# Patient Record
Sex: FEMALE | Race: WHITE | NOT HISPANIC OR LATINO | Employment: FULL TIME | URBAN - METROPOLITAN AREA
[De-identification: names, ages, dates, MRNs, and addresses within clinical notes are randomized per-mention and may not be internally consistent; named-entity substitution may affect disease eponyms.]

---

## 2017-01-25 ENCOUNTER — OFFICE VISIT (OUTPATIENT)
Dept: INTERNAL MEDICINE | Facility: CLINIC | Age: 32
End: 2017-01-25

## 2017-01-25 VITALS
HEIGHT: 67 IN | OXYGEN SATURATION: 98 % | HEART RATE: 70 BPM | WEIGHT: 176 LBS | SYSTOLIC BLOOD PRESSURE: 122 MMHG | DIASTOLIC BLOOD PRESSURE: 80 MMHG | BODY MASS INDEX: 27.62 KG/M2

## 2017-01-25 DIAGNOSIS — R00.2 HEART PALPITATIONS: ICD-10-CM

## 2017-01-25 DIAGNOSIS — F41.8 MIXED ANXIETY AND DEPRESSIVE DISORDER: Primary | ICD-10-CM

## 2017-01-25 DIAGNOSIS — F41.0 PANIC ATTACK: ICD-10-CM

## 2017-01-25 PROBLEM — F41.1 GAD (GENERALIZED ANXIETY DISORDER): Status: RESOLVED | Noted: 2017-01-25 | Resolved: 2017-01-25

## 2017-01-25 PROBLEM — F41.1 GAD (GENERALIZED ANXIETY DISORDER): Status: ACTIVE | Noted: 2017-01-25

## 2017-01-25 LAB
ALBUMIN SERPL-MCNC: 5 G/DL (ref 3.5–5.2)
ALBUMIN/GLOB SERPL: 2.1 G/DL
ALP SERPL-CCNC: 48 U/L (ref 40–129)
ALT SERPL-CCNC: 12 U/L (ref 5–33)
AST SERPL-CCNC: 15 U/L (ref 5–32)
BASOPHILS # BLD AUTO: 0.06 10*3/MM3 (ref 0–0.2)
BASOPHILS NFR BLD AUTO: 0.8 % (ref 0–2)
BILIRUB SERPL-MCNC: 0.7 MG/DL (ref 0.2–1.2)
BUN SERPL-MCNC: 12 MG/DL (ref 6–20)
BUN/CREAT SERPL: 16.2 (ref 7–25)
CALCIUM SERPL-MCNC: 9.7 MG/DL (ref 8.6–10.5)
CHLORIDE SERPL-SCNC: 98 MMOL/L (ref 98–107)
CHOLEST SERPL-MCNC: 162 MG/DL (ref 0–200)
CHOLEST/HDLC SERPL: 2.84 {RATIO}
CO2 SERPL-SCNC: 25 MMOL/L (ref 22–29)
CREAT SERPL-MCNC: 0.74 MG/DL (ref 0.57–1)
EOSINOPHIL # BLD AUTO: 0.09 10*3/MM3 (ref 0.1–0.3)
EOSINOPHIL NFR BLD AUTO: 1.2 % (ref 0–4)
ERYTHROCYTE [DISTWIDTH] IN BLOOD BY AUTOMATED COUNT: 12.1 % (ref 11.5–14.5)
GLOBULIN SER CALC-MCNC: 2.4 GM/DL
GLUCOSE SERPL-MCNC: 83 MG/DL (ref 65–99)
HCT VFR BLD AUTO: 41.4 % (ref 37–47)
HDLC SERPL-MCNC: 57 MG/DL (ref 40–60)
HGB BLD-MCNC: 13.5 G/DL (ref 12–16)
IMM GRANULOCYTES # BLD: 0.03 10*3/MM3 (ref 0–0.03)
IMM GRANULOCYTES NFR BLD: 0.4 % (ref 0–0.5)
LDLC SERPL CALC-MCNC: 89 MG/DL (ref 0–100)
LYMPHOCYTES # BLD AUTO: 2.64 10*3/MM3 (ref 0.6–4.8)
LYMPHOCYTES NFR BLD AUTO: 34.8 % (ref 20–45)
MCH RBC QN AUTO: 28.5 PG (ref 27–31)
MCHC RBC AUTO-ENTMCNC: 32.6 G/DL (ref 31–37)
MCV RBC AUTO: 87.3 FL (ref 81–99)
MONOCYTES # BLD AUTO: 0.68 10*3/MM3 (ref 0–1)
MONOCYTES NFR BLD AUTO: 9 % (ref 3–8)
NEUTROPHILS # BLD AUTO: 4.08 10*3/MM3 (ref 1.5–8.3)
NEUTROPHILS NFR BLD AUTO: 53.8 % (ref 45–70)
NRBC BLD AUTO-RTO: 0 /100 WBC (ref 0–0)
PLATELET # BLD AUTO: 184 10*3/MM3 (ref 140–500)
POTASSIUM SERPL-SCNC: 3.8 MMOL/L (ref 3.5–5.2)
PROT SERPL-MCNC: 7.4 G/DL (ref 6–8.5)
RBC # BLD AUTO: 4.74 10*6/MM3 (ref 4.2–5.4)
SODIUM SERPL-SCNC: 137 MMOL/L (ref 136–145)
T4 SERPL-MCNC: 7.71 MCG/DL (ref 4.5–11.7)
TRIGL SERPL-MCNC: 81 MG/DL (ref 0–150)
TSH SERPL DL<=0.005 MIU/L-ACNC: 1.66 MIU/ML (ref 0.27–4.2)
VIT B12 SERPL-MCNC: 318 PG/ML (ref 211–946)
VLDLC SERPL CALC-MCNC: 16.2 MG/DL (ref 7–27)
WBC # BLD AUTO: 7.58 10*3/MM3 (ref 4.8–10.8)

## 2017-01-25 PROCEDURE — 99214 OFFICE O/P EST MOD 30 MIN: CPT | Performed by: NURSE PRACTITIONER

## 2017-01-25 RX ORDER — CLONAZEPAM 0.5 MG/1
0.5 TABLET ORAL NIGHTLY PRN
Qty: 30 TABLET | Refills: 0 | Status: SHIPPED | OUTPATIENT
Start: 2017-01-25 | End: 2017-03-10 | Stop reason: SDUPTHER

## 2017-01-25 RX ORDER — DESVENLAFAXINE 50 MG/1
50 TABLET, EXTENDED RELEASE ORAL DAILY
Qty: 30 TABLET | Refills: 9 | COMMUNITY
Start: 2017-01-25 | End: 2017-04-12

## 2017-01-25 NOTE — PROGRESS NOTES
Chief Complaint   Patient presents with   • Anxiety       Subjective     Robert Beach is a 31 y.o. female being seen for a follow up appointment today regarding  Anxiety. She just recently moved back to [facility] [place] [facility]. She has 4 children 13 yr old, 11 yr old twins, and 4 yr old. She has been having increased anxiety, palpitations, panic attacks at night. She is having daily anxiety, panic attacks last 20 mins-40 mins. She is trying deep breathing. She had an  1 year old, and had a Shruthi IUD placed after. Associated depression, lack of motivation. No suicidal thoughts or attempts. She is still working for oral surgeon. She has taken Lexapro and Effexor in the past.        History of Present Illness     Allergies   Allergen Reactions   • Venlafaxine    • Desvenlafaxine Palpitations         Current Outpatient Prescriptions:   •  acyclovir (ZOVIRAX) 400 MG tablet, TAKE ONE TABLET BY MOUTH ONCE DAILY, Disp: 90 tablet, Rfl: 1  •  clonazePAM (KlonoPIN) 0.5 MG tablet, Take 0.5 mg by mouth at night as needed for seizures., Disp: , Rfl:   •  fluticasone (FLONASE) 50 MCG/ACT nasal spray, into each nostril daily., Disp: , Rfl:   •  ibuprofen (ADVIL,MOTRIN) 800 MG tablet, Take 1 tablet by mouth Every 8 (Eight) Hours As Needed for mild pain (1-3)., Disp: 30 tablet, Rfl: 0  •  meclizine (ANTIVERT) 25 MG tablet, Take 1 tablet by mouth 4 (four) times a day as needed for dizziness., Disp: 30 tablet, Rfl: 0  •  montelukast (SINGULAIR) 10 MG tablet, Take 1 tablet by mouth Every Night., Disp: 30 tablet, Rfl: 1  •  topiramate (TOPAMAX) 25 MG tablet, Take 1 tablet by mouth every night., Disp: 30 tablet, Rfl: 0  •  amoxicillin-clavulanate (AUGMENTIN) 875-125 MG per tablet, Take 1 tablet by mouth 2 (Two) Times a Day., Disp: 20 tablet, Rfl: 0  •  MethylPREDNISolone (MEDROL, MATTHIAS,) 4 MG tablet, Take as directed on package instructions., Disp: 21 tablet, Rfl: 0    The following portions of the patient's history were  reviewed and updated as appropriate: allergies, current medications, past family history, past medical history, past social history, past surgical history and problem list.    Review of Systems   Constitutional: Negative.    HENT: Negative.    Eyes: Negative.    Respiratory: Negative.    Cardiovascular: Negative.    Gastrointestinal: Negative.    Endocrine: Negative.    Genitourinary: Negative.    Musculoskeletal: Negative.    Psychiatric/Behavioral: Positive for agitation and sleep disturbance. The patient is nervous/anxious.        Assessment     Physical Exam   Constitutional: She is oriented to person, place, and time. She appears well-developed and well-nourished.   HENT:   Head: Normocephalic.   Right Ear: External ear normal.   Left Ear: External ear normal.   Mouth/Throat: Oropharynx is clear and moist.   Neck: Neck supple. No thyromegaly present.   Cardiovascular: Normal rate, regular rhythm and normal heart sounds.    No murmur heard.  Pulmonary/Chest: Breath sounds normal. No respiratory distress.   Musculoskeletal: She exhibits no edema.   Neurological: She is alert and oriented to person, place, and time. No cranial nerve deficit.   Psychiatric: Her behavior is normal. Her mood appears anxious. She expresses impulsivity.   Vitals reviewed.      Rick Jones was seen today for anxiety.    Diagnoses and all orders for this visit:    Mixed anxiety and depressive disorder  -     Comprehensive metabolic panel  -     Conv Lipid Panel w/ Chol/HDL Ratio  -     T4 & TSH ([facility])  -     CBC & Differential    Panic attack  -     clonazePAM (KlonoPIN) 0.5 MG tablet; Take 1 tablet by mouth At Night As Needed for seizures.       Diagnosis Plan   1. Mixed anxiety and depressive disorder  Comprehensive metabolic panel    Conv Lipid Panel w/ Chol/HDL Ratio    T4 & TSH ([facility])    CBC & Differential    desvenlafaxine (PRISTIQ) 50 MG 24 hr tablet   2. Panic attack  clonazePAM (KlonoPIN) 0.5 MG tablet     desvenlafaxine (PRISTIQ) 50 MG 24 hr tablet   3. Heart palpitations  Vitamin B12     Discussed SNRIs. Pristiq started    The patient has read and signed the [facility] [employer] Substance Contract.  I will continue to see patient for regular follow up appointments.  They are well controlled on their medication.  JOSE ELIAS is updated every 3 months. The patient is aware of the potential for addiction and dependence.    The patient has read and signed the [facility] [employer] Substance Contract.  I will continue to see patient for regular follow up appointments.  They are well controlled on their medication.  JOSE ELIAS is updated every 3 months. The patient is aware of the potential for addiction. The patient has read and signed the [facility] [employer] Substance Contract.  I will continue to see patient for regular follow up appointments.  They are well controlled on their medication.  JOSE ELIAS is updated every 3 months. The patient is aware of the potential for addiction and dependence.on and dependence.

## 2017-01-25 NOTE — MR AVS SNAPSHOT
Robert Beach   1/25/2017 3:00 PM   Office Visit    Dept Phone:  [phone]   Encounter #:  [id number]    Provider:  DANIELE Dhaliwal   Department:  [facility] INTERNAL MED AND PEDS                Your Full Care Plan              Today's Medication Changes          These changes are accurate as of: 1/25/17  4:12 PM.  If you have any questions, ask your nurse or doctor.               New Medication(s)Ordered:     desvenlafaxine 50 MG 24 hr tablet   Commonly known as:  PRISTIQ   Take 1 tablet by mouth Daily.   Started by:  DANIELE Dhaliwal         Stop taking medication(s)listed here:     amoxicillin-clavulanate 875-125 MG per tablet   Commonly known as:  AUGMENTIN   Stopped by:  DANIELE Dhaliwal           meclizine 25 MG tablet   Commonly known as:  ANTIVERT   Stopped by:  DANIELE Dhaliwal           MethylPREDNISolone 4 MG tablet   Commonly known as:  MEDROL (MATTHIAS)   Stopped by:  DANIELE Dhaliwal                Where to Get Your Medications      You can get these medications from any pharmacy     Bring a paper prescription for each of these medications     clonazePAM 0.5 MG tablet         Information about where to get these medications is not yet available     ! Ask your nurse or doctor about these medications     desvenlafaxine 50 MG 24 hr tablet                  Your Updated Medication List          This list is accurate as of: 1/25/17  4:12 PM.  Always use your most recent med list.                acyclovir 400 MG tablet   Commonly known as:  ZOVIRAX   TAKE ONE TABLET BY MOUTH ONCE DAILY       clonazePAM 0.5 MG tablet   Commonly known as:  KlonoPIN   Take 1 tablet by mouth At Night As Needed for seizures.       desvenlafaxine 50 MG 24 hr tablet   Commonly known as:  PRISTIQ   Take 1 tablet by mouth Daily.       fluticasone 50 MCG/ACT nasal spray   Commonly known as:  FLONASE       ibuprofen 800 MG tablet   Commonly known as:  ADVIL,MOTRIN   Take 1 tablet by mouth  "Every 8 (Eight) Hours As Needed for mild pain (1-3).       montelukast 10 MG tablet   Commonly known as:  SINGULAIR   Take 1 tablet by mouth Every Night.       topiramate 25 MG tablet   Commonly known as:  TOPAMAX   Take 1 tablet by mouth every night.               We Performed the Following     CBC & Differential     Comprehensive metabolic panel     Conv Lipid Panel w/ Chol/HDL Ratio     T4 & TSH ([facility])     Vitamin B12       You Were Diagnosed With        Codes Comments    Mixed anxiety and depressive disorder    -  Primary ICD-10-CM: F41.8  ICD-9-CM: 300.4     Panic attack     ICD-10-CM: F41.0  ICD-9-CM: 300.01     Heart palpitations     ICD-10-CM: R00.2  ICD-9-CM: 785.1       Instructions     None    Patient Instructions History      Upcoming Appointments     Visit Type Date Time Department    OFFICE VISIT 1/25/2017  3:00 PM [facility]      [facility] Signup     Our records indicate that you have an active [employer] account.    You can view your After Visit Summary by going to [facility] and logging in with your [facility] username and password.  If you don't have a [facility] username and password but a parent or guardian has access to your record, the parent or guardian should login with their own [facility] username and password and access your record to view the After Visit Summary.    If you have questions, you can email [email or web address] or call [phone] to talk to our [facility] staff.  Remember, [facility] is NOT to be used for urgent needs.  For medical emergencies, dial 911.               Other Info from Your Visit           Allergies     Venlafaxine Intolerance     Desvenlafaxine Intolerance Palpitations      Reason for Visit     Anxiety           Vital Signs     Blood Pressure Pulse Height Weight Oxygen Saturation Body Mass Index    122/80 70 67\" (170.2 cm) 176 lb (79.8 kg) 98% 27.57 kg/m2    Smoking Status                   Never Smoker           Problems and " Diagnoses Noted     Mixed anxiety and depressive disorder    Panic attack        Heart palpitations          No Longer an Issue     Breath shortness    DORA (generalized anxiety disorder)    Skin infection

## 2017-01-26 ENCOUNTER — TELEPHONE (OUTPATIENT)
Dept: INTERNAL MEDICINE | Facility: CLINIC | Age: 32
End: 2017-01-26

## 2017-01-26 NOTE — TELEPHONE ENCOUNTER
----- Message from DANIELE Dhaliwal sent at 1/26/2017  7:55 AM EST -----  Her labs do not shown any underlying thyroid condition or anemia. B12 is slightly low, but could be corrected with a B12 Sublingual drop OTC or daily b complex vitamin.   ----- Message -----     From: Surendra [facility] Results In     Sent: 1/25/2017   8:08 PM       To: DANIELE Dhaliwal

## 2017-01-27 ENCOUNTER — TELEPHONE (OUTPATIENT)
Dept: INTERNAL MEDICINE | Facility: CLINIC | Age: 32
End: 2017-01-27

## 2017-03-07 DIAGNOSIS — F41.0 PANIC ATTACK: ICD-10-CM

## 2017-03-07 RX ORDER — CLONAZEPAM 0.5 MG/1
TABLET ORAL
Qty: 30 TABLET | Refills: 0 | Status: CANCELLED | OUTPATIENT
Start: 2017-03-07

## 2017-03-10 DIAGNOSIS — F41.0 PANIC ATTACK: ICD-10-CM

## 2017-03-10 RX ORDER — CLONAZEPAM 0.5 MG/1
0.5 TABLET ORAL NIGHTLY PRN
Qty: 30 TABLET | Refills: 0 | OUTPATIENT
Start: 2017-03-10 | End: 2020-03-14

## 2017-03-15 ENCOUNTER — HOSPITAL ENCOUNTER (EMERGENCY)
Facility: HOSPITAL | Age: 32
Discharge: HOME | End: 2017-03-15
Attending: EMERGENCY MEDICINE | Admitting: EMERGENCY MEDICINE

## 2017-03-15 VITALS
DIASTOLIC BLOOD PRESSURE: 77 MMHG | WEIGHT: 170 LBS | RESPIRATION RATE: 18 BRPM | SYSTOLIC BLOOD PRESSURE: 118 MMHG | BODY MASS INDEX: 26.68 KG/M2 | HEART RATE: 84 BPM | HEIGHT: 67 IN | TEMPERATURE: 97.7 F | OXYGEN SATURATION: 100 %

## 2017-03-15 DIAGNOSIS — F41.9 ANXIETY: ICD-10-CM

## 2017-03-15 DIAGNOSIS — F13.939 BENZODIAZEPINE WITHDRAWAL, WITH UNSPECIFIED COMPLICATION (HCC): Primary | ICD-10-CM

## 2017-03-15 PROCEDURE — 99282 EMERGENCY DEPT VISIT SF MDM: CPT | Performed by: EMERGENCY MEDICINE

## 2017-03-15 PROCEDURE — 99284 EMERGENCY DEPT VISIT MOD MDM: CPT

## 2017-03-15 RX ORDER — ALPRAZOLAM 0.5 MG/1
0.5 TABLET ORAL 2 TIMES DAILY PRN
COMMUNITY
End: 2017-04-12

## 2017-03-15 NOTE — ED PROVIDER NOTES
Subjective   History of Present Illness  History of Present Illness    Chief complaint: Shortness of breath, palpitations, dizziness    Location: No focal pain    Quality/Severity:  Moderately severe    Timing/Duration: Gradually worsening over the day but worse the last hour prior to arrival in the ED    Modifying Factors: Seems to resolve now after taking Xanax    Associated Symptoms: 2 weeks' worth of mild dry cough.  No shortness of breath    Narrative: 31-year-old female who is been on Klonopin for many years using on average 2 daily ran out of her prescription 3 days ago and has not picked up the refill from the pharmacy.  She started to have jitters this morning with tremors and then this afternoon developed shortness of breath and palpitations.  Symptoms now completely resolved after taking Xanax about 30 minutes ago.    Review of Systems  All systems reviewed and are negative.  No dyspnea on exertion.  No hemoptysis.  Past Medical History   Diagnosis Date   • Anxiety    • Heart palpitations    • Migraine        Allergies   Allergen Reactions   • Venlafaxine    • Desvenlafaxine Palpitations       Past Surgical History   Procedure Laterality Date   • Dilatation and curettage     • Induced  N/A 2016   • Intrauterine device insertion  2016     Shruthi       Family History   Problem Relation Age of Onset   • Diabetes Mother        Social History     Social History   • Marital status:      Spouse name: N/A   • Number of children: N/A   • Years of education: N/A     Social History Main Topics   • Smoking status: Never Smoker   • Smokeless tobacco: None   • Alcohol use Yes      Comment: social   • Drug use: No   • Sexual activity: Defer     Other Topics Concern   • None     Social History Narrative           Objective   Physical Exam   Constitutional: She is oriented to person, place, and time. She appears well-developed. No distress.   HENT:   Head: Normocephalic.   Mouth/Throat: Oropharynx  is clear and moist.   Eyes: Conjunctivae are normal. No scleral icterus.   Neck: Neck supple.   Painless movement   Cardiovascular: Normal rate and regular rhythm.    Pulmonary/Chest: Effort normal and breath sounds normal. No respiratory distress.   Abdominal: Soft. There is no tenderness.   Musculoskeletal:   MAEE, normal strength   Neurological: She is alert and oriented to person, place, and time.   Skin: Skin is warm and dry.   Psychiatric: She has a normal mood and affect. Thought content normal.   Nursing note and vitals reviewed.      Procedures         ED Course  ED Course                  MDM  Number of Diagnoses or Management Options  Anxiety:   Benzodiazepine withdrawal, with unspecified complication:   Diagnosis management comments: Suspect acute benzodiazepine withdrawal.  We had a long conversation about benzodiazepines and their danger especially in acute withdrawal.  Recommend close outpatient follow-up with primary care.  Patient states that her prescription for Klonopin is waiting for her at the pharmacy and she will go directly and get at this time.      Final diagnoses:   Benzodiazepine withdrawal, with unspecified complication   Anxiety              Medication List      Stop          acyclovir 400 MG tablet   Commonly known as:  ZOVIRAX       ALPRAZolam 0.5 MG tablet   Commonly known as:  XANAX       desvenlafaxine 50 MG 24 hr tablet   Commonly known as:  PRISTIQ       fluticasone 50 MCG/ACT nasal spray   Commonly known as:  FLONASE       montelukast 10 MG tablet   Commonly known as:  SINGULAIR       topiramate 25 MG tablet   Commonly known as:  TOPAMAX                  Balbir Bobo MD  03/15/17 0471

## 2017-03-16 ENCOUNTER — TELEPHONE (OUTPATIENT)
Dept: INTERNAL MEDICINE | Facility: CLINIC | Age: 32
End: 2017-03-16

## 2017-03-16 NOTE — TELEPHONE ENCOUNTER
----- Message from Tiana Perkins sent at 3/16/2017  3:57 PM EDT -----  Regarding: FW: REFILL  Contact: [phone]  I spoke to Robert and told her that we will no longer be able to fill her Clonazepam for her.  She did receive Xanax from another physician.  She said that her employer gave her a short prescription because she was going on a trip.  I tried to ask her why she didn't call us to get a refill and she said that she would find a new doctor and then she hung up on me.    ----- Message -----     From: Tiana Miles MA     Sent: 3/16/2017   3:35 PM       To: Tiana Perkins  Subject: FW: REFILL                                       Per Vonda, patient violated JOSE ELIAS/[facility] regulations by receiving Xanax Rx from other provider recently (see JOSE ELIAS).  Cannot refill Klonopin and need to dismiss.  ----- Message -----     From: Tiana Miles MA     Sent: 3/16/2017  11:12 AM       To: Tiana Miles MA  Subject: FW: REFILL                                           ----- Message -----     From: Christine Rao     Sent: 3/16/2017   8:39 AM       To: [facility]  Subject: REFILL                                           :  85  VONDA PATIENT    PATIENT SAID THAT HER PRESCRIPTION FOR CLONAZEPAM, 0.5 MG, HAS NOT BEEN RECEIVED BY [facility] IN [place]. COULD THIS BE RESENT?

## 2017-04-12 ENCOUNTER — OFFICE VISIT (OUTPATIENT)
Dept: OBSTETRICS AND GYNECOLOGY | Age: 32
End: 2017-04-12

## 2017-04-12 VITALS
SYSTOLIC BLOOD PRESSURE: 118 MMHG | WEIGHT: 177.4 LBS | BODY MASS INDEX: 26.89 KG/M2 | DIASTOLIC BLOOD PRESSURE: 58 MMHG | HEIGHT: 68 IN

## 2017-04-12 DIAGNOSIS — N92.6 IRREGULAR MENSES: ICD-10-CM

## 2017-04-12 DIAGNOSIS — Z00.00 ANNUAL PHYSICAL EXAM: ICD-10-CM

## 2017-04-12 DIAGNOSIS — N92.1 MENORRHAGIA WITH IRREGULAR CYCLE: Primary | ICD-10-CM

## 2017-04-12 DIAGNOSIS — Z30.09 STERILIZATION CONSULT: ICD-10-CM

## 2017-04-12 PROCEDURE — 99214 OFFICE O/P EST MOD 30 MIN: CPT | Performed by: OBSTETRICS & GYNECOLOGY

## 2017-04-12 RX ORDER — SODIUM CHLORIDE 0.9 % (FLUSH) 0.9 %
1-10 SYRINGE (ML) INJECTION AS NEEDED
Status: CANCELLED | OUTPATIENT
Start: 2017-04-12

## 2017-04-12 RX ORDER — FLUCONAZOLE 150 MG/1
150 TABLET ORAL ONCE
Qty: 2 TABLET | Refills: 1 | Status: SHIPPED | OUTPATIENT
Start: 2017-04-12 | End: 2017-04-12

## 2017-04-12 NOTE — PROGRESS NOTES
Subjective   Robert Beach is a 31 y.o. female is being seen today for always bleeding with Shruthi and pain with sex.  Chief Complaint   Patient presents with   • Vaginal Bleeding     Shruthi issues   .    History of Present Illness  Patient is here for a problem check.  She's having cycles irregularly every 2 weeks And but also has some spotting between those times.  She has some pain with sex upon deeper penetration.  She also has heavy cycles menorrhagia.  There is somewhat better with a [place] that she has had for 1 year less than 1 year actually but still not regulated at all.  She is requesting to have a sterilization and an ablation done at the same time.  The following portions of the patient's history were reviewed and updated as appropriate: allergies, current medications, past family history, past medical history, past social history, past surgical history and problem list.    PAST MEDICAL HISTORY  Past Medical History:   Diagnosis Date   • Anxiety    • Heart palpitations    • Migraine      OB History     No data available        Past Surgical History:   Procedure Laterality Date   • DILATATION AND CURETTAGE     • INDUCED  N/A 2016   • INTRAUTERINE DEVICE INSERTION  2016    Shruthi     Family History   Problem Relation Age of Onset   • Diabetes Mother      History   Smoking Status   • Never Smoker   Smokeless Tobacco   • Not on file       Current Outpatient Prescriptions:   •  acyclovir (ZOVIRAX) 400 MG tablet, TAKE ONE TABLET BY MOUTH ONCE DAILY, Disp: 90 tablet, Rfl: 1  •  clonazePAM (KlonoPIN) 0.5 MG tablet, Take 1 tablet by mouth At Night As Needed for seizures., Disp: 30 tablet, Rfl: 0  •  fluconazole (DIFLUCAN) 150 MG tablet, Take 1 tablet by mouth 1 (One) Time for 1 dose. Repeat in 3 days, Disp: 2 tablet, Rfl: 1  •  ibuprofen (ADVIL,MOTRIN) 800 MG tablet, Take 1 tablet by mouth Every 8 (Eight) Hours As Needed for mild pain (1-3)., Disp: 30 tablet, Rfl: 0    There is no immunization  history on file for this patient.    Review of Systems  As above  Objective   Physical Exam  I did do a pelvic exam showed a very slight yeast infection some tenderness posteriorly because of her retroverted uterus otherwise nothing out of the ordinary.    Assessment/Plan   Robert was seen today for vaginal bleeding.    Diagnoses and all orders for this visit:    Menorrhagia with irregular cycle    Irregular menses    Annual physical exam    Sterilization consult    Other orders  -     fluconazole (DIFLUCAN) 150 MG tablet; Take 1 tablet by mouth 1 (One) Time for 1 dose. Repeat in 3 days    There were talked about her cycling her periods are Alpesh she would like that pulled out but not today.  We talked about sterilization I discussed the procedure in detail I told her the failure rate was 100 400 across the nation.  I did tell about the risks of surgery including anesthetic reaction, bleeding, infection, injury to organs.  I also talked at the ablation and the success rate of amenorrhea versus a light flow.  I talked about the failure rate and the post-ablation syndrome.  I discussed the procedure in detail and the risks of that including anesthetic reaction, bleeding, infection, and injury including perforation.  She has had a perforation before with a D&C.  She is definitely ready after having 4 children and couple other early pregnancies.  We will get that scheduled.  Also saw a minor yeast infection we'll treat that.  I did go over her past surgeries which included the D&C's at least 3.  No other particular medical problems besides anxiety one of the medicines listed in the chart Klonopin is the only thing she is taking right now.  Family we will schedule appropriately

## 2017-04-17 ENCOUNTER — RESULTS ENCOUNTER (OUTPATIENT)
Dept: OBSTETRICS AND GYNECOLOGY | Age: 32
End: 2017-04-17

## 2017-04-17 DIAGNOSIS — N92.1 MENORRHAGIA WITH IRREGULAR CYCLE: ICD-10-CM

## 2017-05-12 ENCOUNTER — TELEPHONE (OUTPATIENT)
Dept: OBSTETRICS AND GYNECOLOGY | Age: 32
End: 2017-05-12

## 2017-05-15 ENCOUNTER — TELEPHONE (OUTPATIENT)
Dept: INTERNAL MEDICINE | Facility: CLINIC | Age: 32
End: 2017-05-15

## 2017-05-15 ENCOUNTER — APPOINTMENT (OUTPATIENT)
Dept: PREADMISSION TESTING | Facility: HOSPITAL | Age: 32
End: 2017-05-15

## 2017-07-19 ENCOUNTER — OFFICE VISIT (OUTPATIENT)
Dept: OBSTETRICS AND GYNECOLOGY | Age: 32
End: 2017-07-19

## 2017-07-19 VITALS
HEIGHT: 68 IN | SYSTOLIC BLOOD PRESSURE: 118 MMHG | DIASTOLIC BLOOD PRESSURE: 68 MMHG | WEIGHT: 188.6 LBS | BODY MASS INDEX: 28.58 KG/M2

## 2017-07-19 DIAGNOSIS — Z12.4 ROUTINE CERVICAL SMEAR: ICD-10-CM

## 2017-07-19 DIAGNOSIS — Z00.00 ANNUAL PHYSICAL EXAM: Primary | ICD-10-CM

## 2017-07-19 DIAGNOSIS — N94.3 PMS (PREMENSTRUAL SYNDROME): ICD-10-CM

## 2017-07-19 DIAGNOSIS — N92.0 MENORRHAGIA WITH REGULAR CYCLE: ICD-10-CM

## 2017-07-19 DIAGNOSIS — Z11.51 SPECIAL SCREENING EXAMINATION FOR HUMAN PAPILLOMAVIRUS (HPV): ICD-10-CM

## 2017-07-19 PROCEDURE — 99395 PREV VISIT EST AGE 18-39: CPT | Performed by: OBSTETRICS & GYNECOLOGY

## 2017-07-19 RX ORDER — CYCLOBENZAPRINE HCL 10 MG
TABLET ORAL
COMMUNITY
Start: 2017-04-25 | End: 2017-11-08

## 2017-07-19 NOTE — PROGRESS NOTES
Subjective   Robert Beach is a 31 y.o. female is being seen today for an annual exam.  Chief Complaint   Patient presents with   • Gynecologic Exam   .    History of Present Illness  Patient is here for an annual check and also to discuss her present IUD and associated problems.  She did recently been in to discuss a possible endometrial ablation and tubal but she canceled for failure of a general anesthetic.  She has a Shruthi for approximately 1 year and her complaints are still somewhat of a heavy flow and dysmenorrhea and PMS for 2 weeks before each period and significant breast pain 2 weeks before each period lasting for 2 weeks.  Her 4 children are doing well one in high school the twins are I think 12 and a little one is 5 years old.  We did mention that her boyfriend might want to get a vasectomy which would help and she will approach that idea.  We also discussed all the different types of birth control, focusing on hormonal methods to try to control her cycles.  We also talked about emotional problems, she is a reactive person and has tried some different medications in the past.  She is set to see a new physician in about a week who specializes in this and she will go over her personality and emotional difficulties with her.  She definitely wants the IUD out today the matter what and is willing to use condoms in the meantime.  She realizes that an ablation and tubal may be the only way to solve her problems.  Otherwise she is having no complaints  The following portions of the patient's history were reviewed and updated as appropriate: allergies, current medications, past family history, past medical history, past social history, past surgical history and problem list.    PAST MEDICAL HISTORY  Past Medical History:   Diagnosis Date   • Anxiety    • Heart palpitations 2012   • Migraine      OB History     No data available        Past Surgical History:   Procedure Laterality Date   • DILATATION AND  CURETTAGE     • INDUCED  N/A 2016   • INTRAUTERINE DEVICE INSERTION  2016    Shruthi     Family History   Problem Relation Age of Onset   • Diabetes Mother      History   Smoking Status   • Never Smoker   Smokeless Tobacco   • Not on file       Current Outpatient Prescriptions:   •  acyclovir (ZOVIRAX) 400 MG tablet, TAKE ONE TABLET BY MOUTH ONCE DAILY, Disp: 90 tablet, Rfl: 1  •  clonazePAM (KlonoPIN) 0.5 MG tablet, Take 1 tablet by mouth At Night As Needed for seizures., Disp: 30 tablet, Rfl: 0  •  cyclobenzaprine (FLEXERIL) 10 MG tablet, , Disp: , Rfl:   •  ibuprofen (ADVIL,MOTRIN) 800 MG tablet, Take 1 tablet by mouth Every 8 (Eight) Hours As Needed for mild pain (1-3)., Disp: 30 tablet, Rfl: 0    There is no immunization history on file for this patient.    Review of Systems   Constitutional: Negative for chills, fatigue, fever and unexpected weight change.   Respiratory: Negative for shortness of breath and wheezing.    Cardiovascular: Negative for chest pain.   Gastrointestinal: Negative for abdominal distention, abdominal pain, blood in stool, constipation, diarrhea and nausea.   Genitourinary: Positive for menstrual problem. Negative for difficulty urinating, dyspareunia, dysuria, frequency, hematuria, pelvic pain, urgency and vaginal discharge.   Skin: Negative for rash.   Psychiatric/Behavioral: The patient is nervous/anxious and is hyperactive.        Objective   Physical Exam   Constitutional: She is oriented to person, place, and time. Vital signs are normal. She appears well-developed and well-nourished.   Neck: No thyromegaly present.   Cardiovascular: Normal rate, regular rhythm and normal heart sounds.    Pulmonary/Chest: Effort normal. Right breast exhibits no inverted nipple, no mass, no nipple discharge, no skin change and no tenderness. Left breast exhibits no inverted nipple, no mass, no nipple discharge, no skin change and no tenderness. Breasts are symmetrical. There is no breast  swelling.   Abdominal: Soft.   Genitourinary: Vagina normal and uterus normal. No breast tenderness, discharge or bleeding. Pelvic exam was performed with patient supine. No labial fusion. There is no rash, tenderness, lesion or injury on the right labia. There is no rash, tenderness, lesion or injury on the left labia. Cervix exhibits no motion tenderness, no discharge and no friability. Right adnexum displays no mass, no tenderness and no fullness. Left adnexum displays no mass, no tenderness and no fullness.   Neurological: She is alert and oriented to person, place, and time.   Psychiatric: She has a normal mood and affect.   Vitals reviewed.        Assessment/Plan   Robert was seen today for gynecologic exam.    Diagnoses and all orders for this visit:    Annual physical exam    Menorrhagia with regular cycle    PMS (premenstrual syndrome)    Exam itself was normal today.  The IUD was pulled intact.  Pap smear was done today.  Call back anytime.  Patient was counseled as far as exercise

## 2017-07-24 LAB
CYTOLOGIST CVX/VAG CYTO: NORMAL
CYTOLOGY CVX/VAG DOC THIN PREP: NORMAL
DX ICD CODE: NORMAL
HIV 1 & 2 AB SER-IMP: NORMAL
HPV I/H RISK 4 DNA CVX QL PROBE+SIG AMP: NEGATIVE
Lab: NORMAL
OTHER STN SPEC: NORMAL
PATH REPORT.FINAL DX SPEC: NORMAL
STAT OF ADQ CVX/VAG CYTO-IMP: NORMAL

## 2017-10-25 ENCOUNTER — TELEPHONE (OUTPATIENT)
Dept: OBSTETRICS AND GYNECOLOGY | Age: 32
End: 2017-10-25

## 2017-10-25 DIAGNOSIS — N92.6 IRREGULAR MENSES: Primary | ICD-10-CM

## 2017-10-25 NOTE — TELEPHONE ENCOUNTER
Pt got a + UPT LMP: 09-12-17. Pt is sched for NOB on 11-16-17 w/ N. Pt had a miscarriage in 2012 and had a successful preg in 2013. Pt wasn't sure if she needed to be sched any sooner or if she needed to come in for labs. Please advise.     Pt#: [phone]

## 2017-10-27 LAB — HCG INTACT+B SERPL-ACNC: 113.8 MIU/ML

## 2017-11-01 ENCOUNTER — TELEPHONE (OUTPATIENT)
Dept: OBSTETRICS AND GYNECOLOGY | Age: 32
End: 2017-11-01

## 2017-11-01 NOTE — TELEPHONE ENCOUNTER
Dr DEREK hughes has nob appt 11-20-17, suppose to come in today to repeat hcg, started spotting yest bright red not bright red today, lmp ?9-12-17 +cramping  Left side. Please advise

## 2017-11-01 NOTE — TELEPHONE ENCOUNTER
I would have her c/i to repeat the second set of labs as the first set is pretty low so an u/s wouldn't show anything. Either she is earlier pregnant then she thought or miscarrying.

## 2017-11-02 ENCOUNTER — TELEPHONE (OUTPATIENT)
Dept: OBSTETRICS AND GYNECOLOGY | Age: 32
End: 2017-11-02

## 2017-11-02 LAB — HCG INTACT+B SERPL-ACNC: NORMAL MIU/ML

## 2017-11-02 RX ORDER — VALACYCLOVIR HYDROCHLORIDE 500 MG/1
500 TABLET, FILM COATED ORAL DAILY
Qty: 90 TABLET | Refills: 0 | Status: SHIPPED | OUTPATIENT
Start: 2017-11-02 | End: 2017-11-08

## 2017-11-03 ENCOUNTER — TELEPHONE (OUTPATIENT)
Dept: OBSTETRICS AND GYNECOLOGY | Age: 32
End: 2017-11-03

## 2017-11-03 NOTE — TELEPHONE ENCOUNTER
----- Message from Wang Mckeon MD sent at 11/2/2017  5:10 AM EDT -----  Please notify beta has increased appropriately it is now at 1700.  Please schedule patient for new OB appointment.

## 2017-11-08 ENCOUNTER — PROCEDURE VISIT (OUTPATIENT)
Dept: OBSTETRICS AND GYNECOLOGY | Age: 32
End: 2017-11-08

## 2017-11-08 ENCOUNTER — OFFICE VISIT (OUTPATIENT)
Dept: OBSTETRICS AND GYNECOLOGY | Age: 32
End: 2017-11-08

## 2017-11-08 ENCOUNTER — TELEPHONE (OUTPATIENT)
Dept: OBSTETRICS AND GYNECOLOGY | Age: 32
End: 2017-11-08

## 2017-11-08 VITALS
SYSTOLIC BLOOD PRESSURE: 122 MMHG | DIASTOLIC BLOOD PRESSURE: 82 MMHG | HEIGHT: 67 IN | WEIGHT: 190 LBS | BODY MASS INDEX: 29.82 KG/M2

## 2017-11-08 DIAGNOSIS — F41.9 ANXIETY: Primary | ICD-10-CM

## 2017-11-08 DIAGNOSIS — O20.0 THREATENED ABORTION IN EARLY PREGNANCY: Primary | ICD-10-CM

## 2017-11-08 DIAGNOSIS — O20.0 THREATENED ABORTION: Primary | ICD-10-CM

## 2017-11-08 DIAGNOSIS — Z3A.01 LESS THAN 8 WEEKS GESTATION OF PREGNANCY: ICD-10-CM

## 2017-11-08 PROCEDURE — 76817 TRANSVAGINAL US OBSTETRIC: CPT | Performed by: OBSTETRICS & GYNECOLOGY

## 2017-11-08 PROCEDURE — 99213 OFFICE O/P EST LOW 20 MIN: CPT | Performed by: PHYSICIAN ASSISTANT

## 2017-11-08 RX ORDER — PRENATAL VIT/IRON FUM/FOLIC AC 27MG-0.8MG
TABLET ORAL DAILY
COMMUNITY
End: 2018-04-10

## 2017-11-08 NOTE — TELEPHONE ENCOUNTER
Dr REED pt, had hcg done last week that was in 1700's, at 5 a.m with urination had a gush of bright red bleeding that filled toilet, now is just spotting brownish, has been exp menstr cramping and low back pain. lmp 9-12-17. Please advise

## 2017-11-08 NOTE — TELEPHONE ENCOUNTER
Offered pt 11:30 but she has a pt scheduled at 11:30, scheduled 1:30 pt aware she will wait to be worked in for u/s

## 2017-11-08 NOTE — TELEPHONE ENCOUNTER
Offer problem visit today with u/s if any available. If none available, put her on my schedule where there is an opening and let her know I will work her in but might be a long wait. Alternative option would be ER if she doesn't want that

## 2017-11-08 NOTE — TELEPHONE ENCOUNTER
Emely epps for got to ask you about her klonopin can you please call her when you are done with patients.

## 2017-11-08 NOTE — PROGRESS NOTES
"Subjective     Chief Complaint   Patient presents with   • Threatened Miscarriage       Robert Beach is a 32 y.o.  whose LMP is No LMP recorded. Patient is pregnant. presents with bleeding in pregnancy. Says it has been going on since she found out she was pregnant.  She denies any relation to IC or straining/acitivity. She denies pain. She is not bleeding now. She has a h/o miscarriage in .  She has O+ blood type.  She had quants done  Here that bennett appropriately.  LMP was around 9-12, thinks conception took place end of September.      She is here with her BF/FOB    She is a pt of Dr Mckeon.  New to me with this problem. Not seen yet for this pregnancy, just had labs done      No Additional Complaints Reported    The following portions of the patient's history were reviewed and updated as appropriate:vital signs, allergies, current medications, past family history, past medical history, past social history, past surgical history and problem list      Review of Systems   A comprehensive review of systems was negative except for: Genitourinary: positive for bleeding in pregnancy     Objective      /90  Ht 67\" (170.2 cm)  Wt 190 lb (86.2 kg)  LMP Comment: unsure maybe 17  Breastfeeding? No  BMI 29.76 kg/m2    Physical Exam    General:   alert, comfortable and no distress   Heart: Not performed today   Lungs: Not performed today.   Breast: Not performed today   Neck: na   Abdomen: {Not performed today   CVA: Not performed today   Pelvis: Not performed today   Extremities: Not performed today   Neurologic: negative   Psychiatric: Normal affect, judgement, and mood       Lab Review   Labs: HCG - quantitative     Imaging   Ultrasound - Pelvic Vaginal    Assessment/Plan     ASSESSMENT  1. Threatened         PLAN  1. Disc with pt that there is a discrepancy in dates at this point which could mean there is a possibility of miscarriage.  At this point, I recommend pelvic rest and avoidance of " IC. We will plan to repeat u/s in 1 wk but pt has been told to call for increased pain or bleeding.  Pt and BF verbalized understanding. All questions answered. She did have a slightly elevated BP at the beginning of the visit which I suspect is r/t anxiety, our repeat BP was 122/82.  Also of note, she is on clonopin and is trying to wean. Encouraged her to c/w tapering.    2. Medications prescribed this encounter:        New Medications Ordered This Visit   Medications   • Prenatal Vit-Fe Fumarate-FA (PRENATAL VITAMIN 27-0.8) 27-0.8 MG tablet tablet     Sig: Take  by mouth Daily.   • Cyanocobalamin (VITAMIN B-12 CR PO)     Sig: Take  by mouth.           Follow up: 1 week(s)    PERIOC Cardona  11/8/2017

## 2017-11-08 NOTE — TELEPHONE ENCOUNTER
DARLENE and for chart purposes only. S/w pt. Enc to taper off clonopin and try benadryl prn anxiety attack.  Will also send referral for f/u to Psychiatry however she has passport so I suspect she will need a referral. Pt advised of this and will look  Into who her PCP is supposed to be

## 2017-11-20 ENCOUNTER — HOSPITAL ENCOUNTER (EMERGENCY)
Facility: HOSPITAL | Age: 32
Discharge: HOME | End: 2017-11-20
Attending: FAMILY MEDICINE | Admitting: FAMILY MEDICINE

## 2017-11-20 ENCOUNTER — APPOINTMENT (OUTPATIENT)
Dept: ULTRASOUND IMAGING | Facility: HOSPITAL | Age: 32
End: 2017-11-20

## 2017-11-20 VITALS
HEIGHT: 68 IN | SYSTOLIC BLOOD PRESSURE: 114 MMHG | TEMPERATURE: 99.1 F | DIASTOLIC BLOOD PRESSURE: 78 MMHG | RESPIRATION RATE: 19 BRPM | WEIGHT: 185 LBS | BODY MASS INDEX: 28.04 KG/M2 | OXYGEN SATURATION: 100 % | HEART RATE: 118 BPM

## 2017-11-20 DIAGNOSIS — O20.0 THREATENED MISCARRIAGE IN EARLY PREGNANCY: Primary | ICD-10-CM

## 2017-11-20 LAB
ABO GROUP BLD: NORMAL
ANION GAP SERPL CALCULATED.3IONS-SCNC: 12.7 MMOL/L
BASOPHILS # BLD AUTO: 0.02 10*3/MM3 (ref 0–0.2)
BASOPHILS # BLD AUTO: 0.03 10*3/MM3 (ref 0–0.2)
BASOPHILS NFR BLD AUTO: 0.3 % (ref 0–1.5)
BASOPHILS NFR BLD AUTO: 0.4 % (ref 0–1.5)
BUN BLD-MCNC: 9 MG/DL (ref 6–20)
BUN/CREAT SERPL: 13 (ref 7–25)
CALCIUM SPEC-SCNC: 9.4 MG/DL (ref 8.6–10.5)
CHLORIDE SERPL-SCNC: 103 MMOL/L (ref 98–107)
CLUE CELLS SPEC QL WET PREP: ABNORMAL
CO2 SERPL-SCNC: 24.3 MMOL/L (ref 22–29)
CREAT BLD-MCNC: 0.69 MG/DL (ref 0.57–1)
DEPRECATED RDW RBC AUTO: 40.7 FL (ref 37–54)
DEPRECATED RDW RBC AUTO: 40.8 FL (ref 37–54)
EOSINOPHIL # BLD AUTO: 0.09 10*3/MM3 (ref 0–0.7)
EOSINOPHIL # BLD AUTO: 0.09 10*3/MM3 (ref 0–0.7)
EOSINOPHIL NFR BLD AUTO: 1.3 % (ref 0.3–6.2)
EOSINOPHIL NFR BLD AUTO: 1.3 % (ref 0.3–6.2)
ERYTHROCYTE [DISTWIDTH] IN BLOOD BY AUTOMATED COUNT: 12.1 % (ref 11.7–13)
ERYTHROCYTE [DISTWIDTH] IN BLOOD BY AUTOMATED COUNT: 12.1 % (ref 11.7–13)
GFR SERPL CREATININE-BSD FRML MDRD: 99 ML/MIN/1.73
GLUCOSE BLD-MCNC: 100 MG/DL (ref 65–99)
HCG INTACT+B SERPL-ACNC: 2420 MIU/ML
HCT VFR BLD AUTO: 39.5 % (ref 35.6–45.5)
HCT VFR BLD AUTO: 39.5 % (ref 35.6–45.5)
HGB BLD-MCNC: 12.8 G/DL (ref 11.9–15.5)
HGB BLD-MCNC: 12.9 G/DL (ref 11.9–15.5)
HOLD SPECIMEN: NORMAL
HOLD SPECIMEN: NORMAL
HYDATID CYST SPEC WET PREP: ABNORMAL
IMM GRANULOCYTES # BLD: 0.02 10*3/MM3 (ref 0–0.03)
IMM GRANULOCYTES # BLD: 0.02 10*3/MM3 (ref 0–0.03)
IMM GRANULOCYTES NFR BLD: 0.3 % (ref 0–0.5)
IMM GRANULOCYTES NFR BLD: 0.3 % (ref 0–0.5)
KOH PREP NAIL: NORMAL
LYMPHOCYTES # BLD AUTO: 1.49 10*3/MM3 (ref 0.9–4.8)
LYMPHOCYTES # BLD AUTO: 1.65 10*3/MM3 (ref 0.9–4.8)
LYMPHOCYTES NFR BLD AUTO: 21.3 % (ref 19.6–45.3)
LYMPHOCYTES NFR BLD AUTO: 24.2 % (ref 19.6–45.3)
MCH RBC QN AUTO: 29.6 PG (ref 26.9–32)
MCH RBC QN AUTO: 29.9 PG (ref 26.9–32)
MCHC RBC AUTO-ENTMCNC: 32.4 G/DL (ref 32.4–36.3)
MCHC RBC AUTO-ENTMCNC: 32.7 G/DL (ref 32.4–36.3)
MCV RBC AUTO: 91.4 FL (ref 80.5–98.2)
MCV RBC AUTO: 91.4 FL (ref 80.5–98.2)
MONOCYTES # BLD AUTO: 0.61 10*3/MM3 (ref 0.2–1.2)
MONOCYTES # BLD AUTO: 0.8 10*3/MM3 (ref 0.2–1.2)
MONOCYTES NFR BLD AUTO: 11.5 % (ref 5–12)
MONOCYTES NFR BLD AUTO: 8.9 % (ref 5–12)
NEUTROPHILS # BLD AUTO: 4.42 10*3/MM3 (ref 1.9–8.1)
NEUTROPHILS # BLD AUTO: 4.56 10*3/MM3 (ref 1.9–8.1)
NEUTROPHILS NFR BLD AUTO: 64.9 % (ref 42.7–76)
NEUTROPHILS NFR BLD AUTO: 65.3 % (ref 42.7–76)
PLATELET # BLD AUTO: 153 10*3/MM3 (ref 140–500)
PLATELET # BLD AUTO: 154 10*3/MM3 (ref 140–500)
PMV BLD AUTO: 11.9 FL (ref 6–12)
PMV BLD AUTO: 12.2 FL (ref 6–12)
POTASSIUM BLD-SCNC: 4 MMOL/L (ref 3.5–5.2)
RBC # BLD AUTO: 4.32 10*6/MM3 (ref 3.9–5.2)
RBC # BLD AUTO: 4.32 10*6/MM3 (ref 3.9–5.2)
RH BLD: POSITIVE
SODIUM BLD-SCNC: 140 MMOL/L (ref 136–145)
T VAGINALIS SPEC QL WET PREP: ABNORMAL
WBC NRBC COR # BLD: 6.82 10*3/MM3 (ref 4.5–10.7)
WBC NRBC COR # BLD: 6.98 10*3/MM3 (ref 4.5–10.7)
WBC SPEC QL WET PREP: ABNORMAL
WHOLE BLOOD HOLD SPECIMEN: NORMAL
WHOLE BLOOD HOLD SPECIMEN: NORMAL
YEAST GENITAL QL WET PREP: ABNORMAL

## 2017-11-20 PROCEDURE — 86900 BLOOD TYPING SEROLOGIC ABO: CPT | Performed by: FAMILY MEDICINE

## 2017-11-20 PROCEDURE — 87491 CHLMYD TRACH DNA AMP PROBE: CPT | Performed by: PHYSICIAN ASSISTANT

## 2017-11-20 PROCEDURE — 76817 TRANSVAGINAL US OBSTETRIC: CPT

## 2017-11-20 PROCEDURE — 86901 BLOOD TYPING SEROLOGIC RH(D): CPT | Performed by: FAMILY MEDICINE

## 2017-11-20 PROCEDURE — 85025 COMPLETE CBC W/AUTO DIFF WBC: CPT | Performed by: OBSTETRICS & GYNECOLOGY

## 2017-11-20 PROCEDURE — 87591 N.GONORRHOEAE DNA AMP PROB: CPT | Performed by: PHYSICIAN ASSISTANT

## 2017-11-20 PROCEDURE — 85025 COMPLETE CBC W/AUTO DIFF WBC: CPT | Performed by: FAMILY MEDICINE

## 2017-11-20 PROCEDURE — 93976 VASCULAR STUDY: CPT

## 2017-11-20 PROCEDURE — 76801 OB US < 14 WKS SINGLE FETUS: CPT

## 2017-11-20 PROCEDURE — 99284 EMERGENCY DEPT VISIT MOD MDM: CPT

## 2017-11-20 PROCEDURE — 36415 COLL VENOUS BLD VENIPUNCTURE: CPT | Performed by: FAMILY MEDICINE

## 2017-11-20 PROCEDURE — 87210 SMEAR WET MOUNT SALINE/INK: CPT | Performed by: PHYSICIAN ASSISTANT

## 2017-11-20 PROCEDURE — 84702 CHORIONIC GONADOTROPIN TEST: CPT | Performed by: FAMILY MEDICINE

## 2017-11-20 PROCEDURE — 87220 TISSUE EXAM FOR FUNGI: CPT | Performed by: PHYSICIAN ASSISTANT

## 2017-11-20 PROCEDURE — 80048 BASIC METABOLIC PNL TOTAL CA: CPT | Performed by: FAMILY MEDICINE

## 2017-11-20 RX ORDER — SODIUM CHLORIDE 0.9 % (FLUSH) 0.9 %
10 SYRINGE (ML) INJECTION AS NEEDED
Status: DISCONTINUED | OUTPATIENT
Start: 2017-11-20 | End: 2017-11-20 | Stop reason: HOSPADM

## 2017-11-20 RX ORDER — ACETAMINOPHEN 325 MG/1
650 TABLET ORAL ONCE
Status: COMPLETED | OUTPATIENT
Start: 2017-11-20 | End: 2017-11-20

## 2017-11-20 RX ADMIN — ACETAMINOPHEN 650 MG: 325 TABLET ORAL at 17:14

## 2017-11-20 NOTE — ED PROVIDER NOTES
"EMERGENCY DEPARTMENT ENCOUNTER    CHIEF COMPLAINT  Chief Complaint: vaginal bleeding- pregnant  History given by: Pt  History limited by: N/A  Room Number:   PMD: Alejandra Jerry MD      HPI:  Pt is a 32 y.o. female who presents 7 weeks pregnant for vaginal bleeding that began earlier today. Pt is [id number], and has experienced vaginal bleeding during a prior pregnancy in . She also c/o R sided abdominal squeezing, and was sent to the ED by her OB/GYN to get checked out. Pt denies any pain when she urinates. There are no other complaints.      Duration: earlier today  Timing:constant  Intensity/Severity:moderate  Progression:unchanged   Associated Symptoms: \"abdominal squeezing\"  Aggravating Factors:none  Alleviating Factors:none  Previous Episodes:Pt has had vaginal bleeding during her pregnancies in the past and had a miscarriage.   Treatment before arrival:No treatment PTA.     MEDICAL RECORD REVIEW  Pt was sent here by her OB/DYN for vaginal bleeding during early pregnancy. Pt was seen in the office by  on  for a threatened . The pt has had two ultrasounds with this pregnancy. Most recently she had an ultrasound 17.    PAST MEDICAL HISTORY  Active Ambulatory Problems     Diagnosis Date Noted   • Mixed anxiety and depressive disorder 2016   • Arthralgia of multiple joints 2016   • Awareness of heartbeats 2016   • Herpes 2016   • Allergic rhinitis, seasonal 2016   • Infection of the upper respiratory tract 2016   • Candida vaginitis 2016   • B12 deficiency 2016   • Cervical radicular pain 10/26/2016     Resolved Ambulatory Problems     Diagnosis Date Noted   • Breath shortness 2016   • Infection of skin 2016   • DORA (generalized anxiety disorder) 2017     Past Medical History:   Diagnosis Date   • Abnormal Pap smear of cervix    • Anxiety    • Bipolar disorder    • Depression    • Facial laceration  " "  • Heart palpitations 2012   • Herpesvirus 2    • Migraine    • Radius/ulna fracture    • Twin pregnancy    • Vaginal delivery        PAST SURGICAL HISTORY  Past Surgical History:   Procedure Laterality Date   • CRYOTHERAPY     • DILATATION AND CURETTAGE     • INDUCED  N/A 2016   • INTRAUTERINE DEVICE INSERTION  2016    Shruthi       FAMILY HISTORY  Family History   Problem Relation Age of Onset   • Diabetes Mother    • Ulcerative colitis Father    • Lung cancer Brother      Small Cell Lung Cancer   • Breast cancer Paternal Aunt 40       SOCIAL HISTORY  Social History     Social History   • Marital status:      Spouse name: N/A   • Number of children: N/A   • Years of education: N/A     Occupational History   • Not on file.     Social History Main Topics   • Smoking status: Never Smoker   • Smokeless tobacco: Not on file   • Alcohol use No   • Drug use: No   • Sexual activity: Defer     Other Topics Concern   • Not on file     Social History Narrative   • No narrative on file       ALLERGIES  Venlafaxine and Desvenlafaxine    REVIEW OF SYSTEMS  Review of Systems   Constitutional: Negative for fever.   HENT: Negative for sore throat.    Eyes: Negative.    Respiratory: Negative for cough and shortness of breath.    Cardiovascular: Negative for chest pain.   Gastrointestinal: Positive for abdominal pain (\"squeezing\"). Negative for diarrhea and vomiting.   Genitourinary: Positive for vaginal bleeding. Negative for dysuria.   Musculoskeletal: Negative for neck pain.   Skin: Negative for rash.   Allergic/Immunologic: Negative.    Neurological: Negative for weakness, numbness and headaches.   Hematological: Negative.    Psychiatric/Behavioral: Negative.    All other systems reviewed and are negative.      PHYSICAL EXAM  ED Triage Vitals   Temp Heart Rate Resp BP SpO2   17 1335 17 1335 17 1337 17 1337 17 1335   99.1 °F (37.3 °C) 118 20 131/77 100 %      Temp src Heart Rate " Source Patient Position BP Location FiO2 (%)   11/20/17 1335 11/20/17 1335 11/20/17 1337 -- --   Tympanic Monitor Sitting         Physical Exam   Constitutional: She is oriented to person, place, and time and well-developed, well-nourished, and in no distress. No distress.   HENT:   Head: Normocephalic and atraumatic.   Mouth/Throat: Oropharynx is clear and moist.   Eyes: EOM are normal.   Neck: Normal range of motion. Neck supple.   Cardiovascular: Normal rate and regular rhythm.    Pulmonary/Chest: Effort normal and breath sounds normal. No respiratory distress. She has no wheezes. She exhibits no tenderness.   Abdominal: Soft. She exhibits no distension. There is no tenderness. There is no rebound.   Genitourinary:   Genitourinary Comments: Chaparoned pelvic exam.  Cervical os is closed.  No vaginal bleeding.  White to light brown discharge in the vault.   Bimanual exam without cervical motion tenderness, suprapubic tenderness, or adnexal tenderness.  Tolerated exam well.       Musculoskeletal: Normal range of motion. She exhibits no edema.   Lymphadenopathy:     She has no cervical adenopathy.   Neurological: She is alert and oriented to person, place, and time.   Skin: Skin is warm and dry. No rash noted. No pallor.   Psychiatric: Mood, memory, affect and judgment normal.   Nursing note and vitals reviewed.      LAB RESULTS  Recent Results (from the past 24 hour(s))   Basic Metabolic Panel    Collection Time: 11/20/17  2:16 PM   Result Value Ref Range    Glucose 100 (H) 65 - 99 mg/dL    BUN 9 6 - 20 mg/dL    Creatinine 0.69 0.57 - 1.00 mg/dL    Sodium 140 136 - 145 mmol/L    Potassium 4.0 3.5 - 5.2 mmol/L    Chloride 103 98 - 107 mmol/L    CO2 24.3 22.0 - 29.0 mmol/L    Calcium 9.4 8.6 - 10.5 mg/dL    eGFR Non African Amer 99 >60 mL/min/1.73    BUN/Creatinine Ratio 13.0 7.0 - 25.0    Anion Gap 12.7 mmol/L   hCG, Quantitative, Pregnancy    Collection Time: 11/20/17  2:16 PM   Result Value Ref Range    HCG  Quantitative 2420.00 mIU/mL   ABO / Rh    Collection Time: 11/20/17  2:16 PM   Result Value Ref Range    ABO Type O     RH type Positive    Light Blue Top    Collection Time: 11/20/17  2:16 PM   Result Value Ref Range    Extra Tube hold for add-on    Green Top (Gel)    Collection Time: 11/20/17  2:16 PM   Result Value Ref Range    Extra Tube Hold for add-ons.    Lavender Top    Collection Time: 11/20/17  2:16 PM   Result Value Ref Range    Extra Tube hold for add-on    Gold Top - SST    Collection Time: 11/20/17  2:16 PM   Result Value Ref Range    Extra Tube Hold for add-ons.    CBC Auto Differential    Collection Time: 11/20/17  2:16 PM   Result Value Ref Range    WBC 6.98 4.50 - 10.70 10*3/mm3    RBC 4.32 3.90 - 5.20 10*6/mm3    Hemoglobin 12.9 11.9 - 15.5 g/dL    Hematocrit 39.5 35.6 - 45.5 %    MCV 91.4 80.5 - 98.2 fL    MCH 29.9 26.9 - 32.0 pg    MCHC 32.7 32.4 - 36.3 g/dL    RDW 12.1 11.7 - 13.0 %    RDW-SD 40.7 37.0 - 54.0 fl    MPV 11.9 6.0 - 12.0 fL    Platelets 153 140 - 500 10*3/mm3    Neutrophil % 65.3 42.7 - 76.0 %    Lymphocyte % 21.3 19.6 - 45.3 %    Monocyte % 11.5 5.0 - 12.0 %    Eosinophil % 1.3 0.3 - 6.2 %    Basophil % 0.3 0.0 - 1.5 %    Immature Grans % 0.3 0.0 - 0.5 %    Neutrophils, Absolute 4.56 1.90 - 8.10 10*3/mm3    Lymphocytes, Absolute 1.49 0.90 - 4.80 10*3/mm3    Monocytes, Absolute 0.80 0.20 - 1.20 10*3/mm3    Eosinophils, Absolute 0.09 0.00 - 0.70 10*3/mm3    Basophils, Absolute 0.02 0.00 - 0.20 10*3/mm3    Immature Grans, Absolute 0.02 0.00 - 0.03 10*3/mm3   CBC Auto Differential    Collection Time: 11/20/17  2:16 PM   Result Value Ref Range    WBC 6.82 4.50 - 10.70 10*3/mm3    RBC 4.32 3.90 - 5.20 10*6/mm3    Hemoglobin 12.8 11.9 - 15.5 g/dL    Hematocrit 39.5 35.6 - 45.5 %    MCV 91.4 80.5 - 98.2 fL    MCH 29.6 26.9 - 32.0 pg    MCHC 32.4 32.4 - 36.3 g/dL    RDW 12.1 11.7 - 13.0 %    RDW-SD 40.8 37.0 - 54.0 fl    MPV 12.2 (H) 6.0 - 12.0 fL    Platelets 154 140 - 500 10*3/mm3     Neutrophil % 64.9 42.7 - 76.0 %    Lymphocyte % 24.2 19.6 - 45.3 %    Monocyte % 8.9 5.0 - 12.0 %    Eosinophil % 1.3 0.3 - 6.2 %    Basophil % 0.4 0.0 - 1.5 %    Immature Grans % 0.3 0.0 - 0.5 %    Neutrophils, Absolute 4.42 1.90 - 8.10 10*3/mm3    Lymphocytes, Absolute 1.65 0.90 - 4.80 10*3/mm3    Monocytes, Absolute 0.61 0.20 - 1.20 10*3/mm3    Eosinophils, Absolute 0.09 0.00 - 0.70 10*3/mm3    Basophils, Absolute 0.03 0.00 - 0.20 10*3/mm3    Immature Grans, Absolute 0.02 0.00 - 0.03 10*3/mm3   RILEY Prep - Swab, Vagina    Collection Time: 11/20/17  5:05 PM   Result Value Ref Range    KOH Prep No yeast or hyphal elements seen No yeast or hyphal elements seen   Wet Prep, Genital - Swab, Vagina    Collection Time: 11/20/17  5:05 PM   Result Value Ref Range    YEAST No yeast seen No yeast seen    HYPHAL ELEMENTS No Hyphal elements seen No Hyphal elements seen    WBC'S 1+ WBC's seen (A) No WBC's seen    Clue Cells, Wet Prep No Clue cells seen No Clue cells seen    Trichomonas, Wet Prep No Trichomonas seen No Trichomonas seen       I ordered the above labs and reviewed the results    RADIOLOGY  US Ob < 14 Weeks Single or First Gestation   Final Result       No intrauterine or extrauterine pregnancy is identified. Mixed   echogenicity material within the endometrial canal, potentially   representing products of conception. Continued close follow-up hCG   levels and ultrasound are recommended.       This report was finalized on 11/20/2017 4:27 PM by Dr. Laith Ovalles MD.          US Ob Transvaginal    (Results Pending)   US Testicular or Ovarian Vascular Limited    (Results Pending)       I ordered the above noted radiological studies and reviewed the images on the PACS system.    COURSE & MEDICAL DECISION MAKING  Pertinent Labs and Imaging studies that were ordered and reviewed are noted above.  Results were reviewed/discussed with the patient and they were also made aware of online assess.  Pt also made aware  that some labs, such as cultures, will not be resulted during ER visit and follow up with PMD is necessary.       PROGRESS AND CONSULTS    Progress Notes:    ED Course     1338 Ordered blood work including CBC, hCG, and BMP for further evaluation. Ordered IVF for hydration.     1452 Placed consult to Dr.Jean Mckeon (OB/GYN).     1500 Ordered Tylenol for the pain, and fetal heart tones.    1507 Ordered to set up pelvic exam.     1538 Discussed pt with Dr.Jennifer Gavino Gimenez (OB/GYN), who is calling back on  (OB/GYN). She wants to get an ultrasound for further evaluation, and will f/u in the office if there are no abnormalities seen.     1540 Ordered US OB <14 weeks single gestation for further evaluation.     1631 Ordered Vagina wet prep swab, KOH prep swab, and STD swab for further evaluation.    1642 Reviewed pt's history and workup with Dr.Denis Ly MD.  After a bedside evaluation;  agrees with the plan of care.     1714 Repeat call out to Dr. Gimenez (OBGYN) to discuss abnormal US.     1741 I discussed the ultrasound findings with  (OBGYN), who could not find the pt's US records. She will call her office and try to obtain the ultrasound report, and will call me back.    1819 Spoke with , who spoke with  (OB/GYN). The pt does have a documented IUP, and they feel the pt is safe to be discharged home with outpatient f/u.    The patient's history, physical exam, and lab findings were discussed with the physician, who also performed a face to face history and physical exam.  I discussed all results and noted any abnormalities with patient.  Discussed absoute need to recheck abnormalities with their family physician.  I answered any of the patient's questions.  Discussed plan for discharge, as there is no emergent indication for admission.  Pt is agreeable and understands need for follow up and repeat testing.  Pt is aware that discharge does not mean that nothing is wrong but it  "indicates no emergency is present and they must continue care with their family physician.  Pt is discharged with instructions to follow up with primary care doctor to have their blood pressure rechecked.         MEDICATIONS GIVEN IN ER  Medications   sodium chloride 0.9 % flush 10 mL (not administered)   acetaminophen (TYLENOL) tablet 650 mg (650 mg Oral Given 11/20/17 1714)       /78 (Patient Position: Sitting)  Pulse 118  Temp 99.1 °F (37.3 °C) (Tympanic)   Resp 19  Ht 68\" (172.7 cm)  Wt 185 lb (83.9 kg)  LMP 07/01/2017 Comment: Pt is having periods twice a month  SpO2 95%  BMI 28.13 kg/m2      DIAGNOSIS  Final diagnoses:   Threatened miscarriage in early pregnancy       FOLLOW UP   Danay Gimenez MD  [address] [facility] [address] [phone]    Schedule an appointment as soon as possible for a visit      Wang Mckeon MD  [address] [facility] [address] [phone]    Schedule an appointment as soon as possible for a visit        RX     Medication List      Notice     No changes were made to your prescriptions during this visit.        Documentation assistance provided by rogers Hernandez for Ivonne Akhtar PA-C.  Information recorded by the scribe was done at my direction and has been verified and validated by me.  Electronically signed by Sophia Hernandez on 11/20/2017 at time 6:21 PM       Kayce Zaafr  11/20/17 1821       Ivonne Akhtar PA-C  11/20/17 1921       Steven Modi MD  11/21/17 0053    "

## 2017-11-20 NOTE — DISCHARGE INSTRUCTIONS
PLEASE READ AND REVIEW ALL DISCHARGE INSTRUCTIONS.     Please follow up with your primary care physician for any further evaluation/treatment and further management of your blood pressure.     Recheck in emergency department for any worsening and/or concerning symptoms.     Take all prescribed medicine as written and continue chronic medication.    Please follow up with Dr. Mckeon/Dr. Gimenez regarding repeat serial quants and repeat US.     Tylenol as needed for abdominal cramping.

## 2017-11-22 ENCOUNTER — PROCEDURE VISIT (OUTPATIENT)
Dept: OBSTETRICS AND GYNECOLOGY | Age: 32
End: 2017-11-22

## 2017-11-22 ENCOUNTER — OFFICE VISIT (OUTPATIENT)
Dept: OBSTETRICS AND GYNECOLOGY | Age: 32
End: 2017-11-22

## 2017-11-22 VITALS
OXYGEN SATURATION: 97 % | HEART RATE: 67 BPM | DIASTOLIC BLOOD PRESSURE: 80 MMHG | SYSTOLIC BLOOD PRESSURE: 126 MMHG | WEIGHT: 190 LBS | HEIGHT: 68 IN | BODY MASS INDEX: 28.79 KG/M2 | TEMPERATURE: 98.6 F

## 2017-11-22 DIAGNOSIS — N94.9 UTERINE TENDERNESS: ICD-10-CM

## 2017-11-22 DIAGNOSIS — O03.9 MISCARRIAGE: Primary | ICD-10-CM

## 2017-11-22 DIAGNOSIS — O20.0 THREATENED ABORTION IN EARLY PREGNANCY: Primary | ICD-10-CM

## 2017-11-22 LAB
BASOPHILS # BLD AUTO: 0 X10E3/UL (ref 0–0.2)
BASOPHILS NFR BLD AUTO: 1 %
C TRACH RRNA SPEC DONR QL NAA+PROBE: NEGATIVE
EOSINOPHIL # BLD AUTO: 0.1 X10E3/UL (ref 0–0.4)
EOSINOPHIL NFR BLD AUTO: 1 %
ERYTHROCYTE [DISTWIDTH] IN BLOOD BY AUTOMATED COUNT: 11.5 % (ref 12.3–15.4)
HCG INTACT+B SERPL-ACNC: 1196 MIU/ML
HCT VFR BLD AUTO: 38.8 % (ref 34–46.6)
HGB BLD-MCNC: 12.6 G/DL (ref 11.1–15.9)
LYMPHOCYTES # BLD AUTO: 1.7 X10E3/UL (ref 0.7–3.1)
LYMPHOCYTES NFR BLD AUTO: 25 %
MCH RBC QN AUTO: 28.8 PG (ref 26.6–33)
MCHC RBC AUTO-ENTMCNC: 32.5 G/DL (ref 31.5–35.7)
MCV RBC AUTO: 89 FL (ref 79–97)
MONOCYTES # BLD AUTO: 0.6 X10E3/UL (ref 0.1–0.9)
MONOCYTES NFR BLD AUTO: 9 %
N GONORRHOEA DNA SPEC QL NAA+PROBE: NEGATIVE
NEUTROPHILS # BLD AUTO: 4.2 X10E3/UL (ref 1.4–7)
NEUTROPHILS NFR BLD AUTO: 64 %
PLATELET # BLD AUTO: 182 X10E3/UL (ref 150–379)
RBC # BLD AUTO: 4.38 X10E6/UL (ref 3.77–5.28)
WBC # BLD AUTO: 6.6 X10E3/UL (ref 3.4–10.8)

## 2017-11-22 PROCEDURE — 99214 OFFICE O/P EST MOD 30 MIN: CPT | Performed by: OBSTETRICS & GYNECOLOGY

## 2017-11-22 PROCEDURE — 76817 TRANSVAGINAL US OBSTETRIC: CPT | Performed by: OBSTETRICS & GYNECOLOGY

## 2017-11-22 RX ORDER — DOXYCYCLINE HYCLATE 100 MG/1
100 CAPSULE ORAL 2 TIMES DAILY
Qty: 14 CAPSULE | Refills: 0 | Status: SHIPPED | OUTPATIENT
Start: 2017-11-22 | End: 2018-04-10

## 2017-11-22 NOTE — PROGRESS NOTES
"    Subjective     No chief complaint on file.    CC: bleeding/pain    Robert Beach is a 32 y.o.  whose LMP is Patient's last menstrual period was 2017. presents with bleeding and cramping. She feels that she is miscarrying. She presented to office this AM and requested emergency work in apt. She was seen at the ER 17 but \"wasn't told anything\". She normally sees Dr Barth for gyn and Dr Mckeon for OB care. She has been bleeding heavily for past several days and has passed tissue. She reports severe pain and cramping \"like contractions\". The pain is relieved with hydrocodone. Prior US 17 noted a gestational sac with a yolk sac but no fetal pole. The sac measured 5 6/7 wk gest. Quant B-HCG 17=1714, 17 =2420 and stat quant today is 1196. She has had 3 prior vaginal deliveries (one set of twins) and had one early SAB that did not require a D&C. She was given cytotec for that.       No Additional Complaints Reported    The following portions of the patient's history were reviewed and updated as appropriate:vital signs, allergies, current medications, past family history, past medical history, past social history, past surgical history and problem list      Review of Systems   Constitutional: negative for fever, chills, activity change, appetite change, fatigue and unexpected weight change.  Eyes: negative  Ears, nose, mouth, throat, and face: negative  Respiratory: negative  Cardiovascular: negative  Gastrointestinal: positive for abdominal pain  Genitourinary:positive for heavy vaginal bleeding and uterine cramping  Hematologic/lymphatic: negative  Musculoskeletal:negative  Neurological: negative  Behavioral/Psych: negative     Objective      LMP 2017 Comment: Pt is having periods twice a month    Physical Exam    General:   alert, appears stated age and no distress   Heart: Not performed today   Lungs: Not performed today.   Breast: Not performed today   Neck:     Abdomen: " {soft, non-tender. Bowel sounds normal. No masses,  no organomegaly   CVA: Not performed today   Pelvis: Normal female genitalia, no vulvar lesions  Vagina w/ moderate dark blood in vault  Cervix w/ small amt of tissue in os, fingertip dilated  Uterus RV, +tenderness  adenxa-no masses, non-tender   Extremities: Extremities normal, atraumatic, no cyanosis or edema   Neurologic: negative   Psychiatric: Normal affect, judgement, and mood       Lab Review   Labs: CBC, HCG - quantitative   Prior CBC in ER was normal    Imaging   Ultrasound - Pelvic Vaginal  Uterus-prior gestational sac that was seen is not present today, small amt of tissue/debris in endometrial cavity, no free fluid, no adnexal masses, normal ovaries    Assessment/Plan     ASSESSMENT  1. Miscarriage    SAB in progress, incomplete AB, early first trimester    PLAN  1.   Orders Placed This Encounter   Procedures   • HCG, B-subunit, Quantitative   • CBC & Differential     Quant HGB decreasing appropriately, recommend following to zero  Discussed option for D&C, cytotec or expectant management, pt desires to observe  2. Medications prescribed this encounter:      No orders of the defined types were placed in this encounter.   rx doxycycline as uterus is tender and to prevent endometritis, as WBC normal and pt afebrile, likely no infection at this time  Percocet rx given prn pain    3. Go to ER for worsening bleeding or pain, fevers or lightheadedness  4. Tissue/POC removed from cervix with Debbie clamp and sent to pathology  Follow up: 1 week(s) to recheck endometrium and check quant    Nancie Daley MD  11/22/2017    Addendum- pt called and left message once quant and CBC obtained today and results reviewed

## 2017-11-26 LAB
DX ICD CODE: NORMAL
DX ICD CODE: NORMAL
PATH REPORT.FINAL DX SPEC: NORMAL
PATH REPORT.GROSS SPEC: NORMAL
PATH REPORT.RELEVANT HX SPEC: NORMAL
PATH REPORT.SITE OF ORIGIN SPEC: NORMAL
PATHOLOGIST NAME: NORMAL
PAYMENT PROCEDURE: NORMAL

## 2017-11-29 ENCOUNTER — TELEPHONE (OUTPATIENT)
Dept: OBSTETRICS AND GYNECOLOGY | Age: 32
End: 2017-11-29

## 2017-11-29 NOTE — TELEPHONE ENCOUNTER
Left message with pt to call and reschedule. She was a no show today for appt with Emely for u/s

## 2018-04-09 ENCOUNTER — TELEPHONE (OUTPATIENT)
Dept: OBSTETRICS AND GYNECOLOGY | Age: 33
End: 2018-04-09

## 2018-04-09 NOTE — TELEPHONE ENCOUNTER
Dr REED pt +preg test lmp 3-5-18 last 2 preg have been miscarriages. Also pt is certain she has been exposed to gonorrhea. Please advise

## 2018-04-09 NOTE — TELEPHONE ENCOUNTER
Please work the patient and with Emely or Lelo today or tomorrow to be tested for gonorrhea and she can also do beta hCG testing.

## 2018-04-10 ENCOUNTER — OFFICE VISIT (OUTPATIENT)
Dept: OBSTETRICS AND GYNECOLOGY | Age: 33
End: 2018-04-10

## 2018-04-10 VITALS
SYSTOLIC BLOOD PRESSURE: 128 MMHG | WEIGHT: 182 LBS | DIASTOLIC BLOOD PRESSURE: 82 MMHG | BODY MASS INDEX: 28.56 KG/M2 | HEIGHT: 67 IN

## 2018-04-10 DIAGNOSIS — Z11.3 SCREEN FOR STD (SEXUALLY TRANSMITTED DISEASE): ICD-10-CM

## 2018-04-10 DIAGNOSIS — N92.6 MISSED MENSES: Primary | ICD-10-CM

## 2018-04-10 DIAGNOSIS — Z32.01 POSITIVE PREGNANCY TEST: ICD-10-CM

## 2018-04-10 LAB
B-HCG UR QL: POSITIVE
INTERNAL NEGATIVE CONTROL: NEGATIVE
INTERNAL POSITIVE CONTROL: POSITIVE
Lab: ABNORMAL

## 2018-04-10 PROCEDURE — 96372 THER/PROPH/DIAG INJ SC/IM: CPT | Performed by: PHYSICIAN ASSISTANT

## 2018-04-10 PROCEDURE — 99213 OFFICE O/P EST LOW 20 MIN: CPT | Performed by: PHYSICIAN ASSISTANT

## 2018-04-10 PROCEDURE — 81025 URINE PREGNANCY TEST: CPT | Performed by: PHYSICIAN ASSISTANT

## 2018-04-10 RX ORDER — AZITHROMYCIN 500 MG/1
TABLET, FILM COATED ORAL
Qty: 2 TABLET | Refills: 0 | Status: SHIPPED | OUTPATIENT
Start: 2018-04-10 | End: 2018-04-17

## 2018-04-10 RX ORDER — CEFTRIAXONE SODIUM 250 MG/1
250 INJECTION, POWDER, FOR SOLUTION INTRAMUSCULAR; INTRAVENOUS ONCE
Status: COMPLETED | OUTPATIENT
Start: 2018-04-10 | End: 2018-04-10

## 2018-04-10 RX ADMIN — CEFTRIAXONE SODIUM 250 MG: 250 INJECTION, POWDER, FOR SOLUTION INTRAMUSCULAR; INTRAVENOUS at 13:24

## 2018-04-11 DIAGNOSIS — Z32.01 POSITIVE PREGNANCY TEST: Primary | ICD-10-CM

## 2018-04-11 LAB
HBV SURFACE AG SERPL QL IA: NEGATIVE
HCG INTACT+B SERPL-ACNC: NORMAL MIU/ML
HCV AB S/CO SERPL IA: 0.2 S/CO RATIO (ref 0–0.9)
HIV 1+2 AB+HIV1 P24 AG SERPL QL IA: NON REACTIVE
PROGEST SERPL-MCNC: 15 NG/ML
RPR SER QL: NORMAL

## 2018-04-12 LAB
C TRACH RRNA SPEC QL NAA+PROBE: NEGATIVE
N GONORRHOEA RRNA SPEC QL NAA+PROBE: NEGATIVE
T VAGINALIS RRNA SPEC QL NAA+PROBE: NEGATIVE

## 2018-04-13 ENCOUNTER — TELEPHONE (OUTPATIENT)
Dept: OBSTETRICS AND GYNECOLOGY | Age: 33
End: 2018-04-13

## 2018-04-13 NOTE — TELEPHONE ENCOUNTER
----- Message from PERICO Rodriguez sent at 4/13/2018  9:18 AM EDT -----  Let her know her std results are actually negative.

## 2018-04-14 LAB — HCG INTACT+B SERPL-ACNC: NORMAL MIU/ML

## 2018-04-16 ENCOUNTER — TELEPHONE (OUTPATIENT)
Dept: OBSTETRICS AND GYNECOLOGY | Age: 33
End: 2018-04-16

## 2018-04-16 NOTE — TELEPHONE ENCOUNTER
Ok to offer the 130 u/s and 115 appt.  I reviewed her labs and they look appropriate, but we can discuss further at the appt today

## 2018-04-16 NOTE — TELEPHONE ENCOUNTER
Dr Ventura pt, states 7 wks preg. Started bleeding bright red last night, scant amount denies SI. Pt states this morning no more bleeding but is cramping. Hx of miscarriage. Pt denies calling on call last night. Please advise. No US til 130.    Pt # [phone]

## 2018-04-17 ENCOUNTER — PROCEDURE VISIT (OUTPATIENT)
Dept: OBSTETRICS AND GYNECOLOGY | Age: 33
End: 2018-04-17

## 2018-04-17 ENCOUNTER — TELEPHONE (OUTPATIENT)
Dept: OBSTETRICS AND GYNECOLOGY | Age: 33
End: 2018-04-17

## 2018-04-17 ENCOUNTER — OFFICE VISIT (OUTPATIENT)
Dept: OBSTETRICS AND GYNECOLOGY | Age: 33
End: 2018-04-17

## 2018-04-17 VITALS
BODY MASS INDEX: 28.56 KG/M2 | WEIGHT: 182 LBS | DIASTOLIC BLOOD PRESSURE: 84 MMHG | SYSTOLIC BLOOD PRESSURE: 134 MMHG | HEIGHT: 67 IN

## 2018-04-17 DIAGNOSIS — O20.9 BLEEDING IN EARLY PREGNANCY: Primary | ICD-10-CM

## 2018-04-17 DIAGNOSIS — O36.80X0 ENCOUNTER TO DETERMINE FETAL VIABILITY OF PREGNANCY, SINGLE OR UNSPECIFIED FETUS: Primary | ICD-10-CM

## 2018-04-17 PROCEDURE — 76817 TRANSVAGINAL US OBSTETRIC: CPT | Performed by: OBSTETRICS & GYNECOLOGY

## 2018-04-17 PROCEDURE — 99213 OFFICE O/P EST LOW 20 MIN: CPT | Performed by: PHYSICIAN ASSISTANT

## 2018-04-17 NOTE — PROGRESS NOTES
"Subjective     Chief Complaint   Patient presents with   • Threatened Miscarriage     c/o spotting an cramping       Robert Beach is a 32 y.o.  whose LMP is Patient's last menstrual period was 2018. presents with f/u u/s in newly pregnant pt that has h/o recurrent miscarriage. I saw pt last week to confirm pregnancy, we ordered labs which were drawn and her quant levels bennett appropriately ([id number]) over a 48 hour period.  She called yesterday and noted bleeding.  It has tapered off but she does see some spotting still. She has cramping but no pain. She is sxatic for pregnancy, notes nausea and has used her sister's zofrant to treat it.  She also notes that she is on clonopin and takes it approx q3 days for anxiety attacks.  She knows she needs to stop it but is worried about withdrawal sx's.  She is a pt of Dr Mckeon    No Additional Complaints Reported    The following portions of the patient's history were reviewed and updated as appropriate:vital signs, allergies, current medications, past family history, past medical history, past social history, past surgical history and problem list      Review of Systems   Gastrointestinal: positive for nausea  Genitourinary:positive for early pregnancy bleeding     Objective      /84   Ht 170.2 cm (67\")   Wt 82.6 kg (182 lb)   LMP 2018   BMI 28.51 kg/m²     Physical Exam    General:   alert, comfortable and no distress   Heart: Not performed today   Lungs: Not performed today.   Breast: Not performed today   Neck: na   Abdomen: {Not performed today   CVA: Not performed today   Pelvis: Vaginal: discharge, brown d/c  Cervix: normal appearance   Extremities: Not performed today   Neurologic: negative   Psychiatric: Normal affect, judgement, and mood       Lab Review   Labs: HCG - quantitative     Imaging   Ultrasound - Pelvic Vaginal IUP noted, GS measures 6 wks 3 days, FHT measure 110    Assessment/Plan     ASSESSMENT  1. Bleeding in early " pregnancy        PLAN  1. U/s is reassuring. Enc pelvic rest for now and plan f/u new ob visit with Dr Mckeon in 2 wks with u/s    2. Disc nausea and enc diclegis and gave samples.  Advised to d/c the zofran    3. Disc clonopin and disc dcing it.  I enc her to try benadryl instead.  Advised her to call [employer] as they can set her up with a Psychiatrist. Also discussed outpt services available to pt at [facility].      Follow up: 2 week(s)    PERICO Cardona  4/17/2018

## 2018-04-17 NOTE — TELEPHONE ENCOUNTER
----- Message from PERICO Rodriguez sent at 4/16/2018  8:59 AM EDT -----  Her quants are increasing appropriately, we will see her tomorrow for u/s and f/u

## 2018-04-27 ENCOUNTER — TELEPHONE (OUTPATIENT)
Dept: OBSTETRICS AND GYNECOLOGY | Age: 33
End: 2018-04-27

## 2018-04-27 DIAGNOSIS — Z3A.01 LESS THAN 8 WEEKS GESTATION OF PREGNANCY: ICD-10-CM

## 2018-04-27 DIAGNOSIS — R00.2 PALPITATIONS: Primary | ICD-10-CM

## 2018-04-27 NOTE — TELEPHONE ENCOUNTER
Dr mullins pt, about 7 wks preg. States she went to [facility] yesterday for heart palpitations/fluttering. Pt was advised to f/u with cardiology, but pt does not want to see a [facility] MD. Can we refer pt or would she need to be seen first. Please advise.    Pt # [phone]

## 2018-04-27 NOTE — TELEPHONE ENCOUNTER
I would plan a problem visit to r/o physiologic cause and can also start the process of a CV referral in the meantime.

## 2018-05-22 ENCOUNTER — TELEPHONE (OUTPATIENT)
Dept: OBSTETRICS AND GYNECOLOGY | Age: 33
End: 2018-05-22

## 2018-05-22 NOTE — TELEPHONE ENCOUNTER
Pt is coming in Tuesday for lower abdominal pain. Pt states she went to [facility] and they found fluid in her pelvic area and some cyst. Do you want a U/S with this appointment? All the [facility] records are in Care everywhere for the U/S report.

## 2018-05-23 ENCOUNTER — TELEPHONE (OUTPATIENT)
Dept: OBSTETRICS AND GYNECOLOGY | Age: 33
End: 2018-05-23

## 2018-05-25 ENCOUNTER — OFFICE VISIT (OUTPATIENT)
Dept: OBSTETRICS AND GYNECOLOGY | Age: 33
End: 2018-05-25

## 2018-05-25 ENCOUNTER — PROCEDURE VISIT (OUTPATIENT)
Dept: OBSTETRICS AND GYNECOLOGY | Age: 33
End: 2018-05-25

## 2018-05-25 VITALS
WEIGHT: 189 LBS | DIASTOLIC BLOOD PRESSURE: 70 MMHG | HEIGHT: 68 IN | SYSTOLIC BLOOD PRESSURE: 110 MMHG | BODY MASS INDEX: 28.64 KG/M2

## 2018-05-25 DIAGNOSIS — O03.9 MISCARRIAGE: Primary | ICD-10-CM

## 2018-05-25 DIAGNOSIS — R10.2 PELVIC PAIN: ICD-10-CM

## 2018-05-25 DIAGNOSIS — N83.209 CYST OF OVARY, UNSPECIFIED LATERALITY: Primary | ICD-10-CM

## 2018-05-25 DIAGNOSIS — N83.201 CYST OF RIGHT OVARY: ICD-10-CM

## 2018-05-25 LAB — HCG INTACT+B SERPL-ACNC: 3.92 MIU/ML

## 2018-05-25 PROCEDURE — 99213 OFFICE O/P EST LOW 20 MIN: CPT | Performed by: OBSTETRICS & GYNECOLOGY

## 2018-05-25 PROCEDURE — 76830 TRANSVAGINAL US NON-OB: CPT | Performed by: OBSTETRICS & GYNECOLOGY

## 2018-05-25 RX ORDER — NORETHINDRONE ACETATE AND ETHINYL ESTRADIOL AND FERROUS FUMARATE 1MG-20(24)
1 KIT ORAL DAILY
Qty: 28 TABLET | Refills: 1 | Status: SHIPPED | OUTPATIENT
Start: 2018-05-25 | End: 2018-07-27

## 2018-05-25 RX ORDER — DOXYCYCLINE HYCLATE 100 MG/1
100 CAPSULE ORAL 2 TIMES DAILY
COMMUNITY
End: 2018-05-25
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
## 2018-05-29 ENCOUNTER — TELEPHONE (OUTPATIENT)
Dept: OBSTETRICS AND GYNECOLOGY | Age: 33
End: 2018-05-29

## 2018-05-29 NOTE — TELEPHONE ENCOUNTER
----- Message from Raymon Barth MD sent at 5/29/2018  8:39 AM EDT -----  Tell patient hCG is totally negative

## 2018-05-30 ENCOUNTER — TELEPHONE (OUTPATIENT)
Dept: OBSTETRICS AND GYNECOLOGY | Age: 33
End: 2018-05-30

## 2018-05-30 NOTE — TELEPHONE ENCOUNTER
Patient called stating LQP has increased since she saw Dr. Barth last week and today she's rain a low grade fever of 100.1. Please advise.

## 2018-05-31 ENCOUNTER — HOSPITAL ENCOUNTER (EMERGENCY)
Facility: HOSPITAL | Age: 33
Discharge: HOME | End: 2018-05-31
Attending: EMERGENCY MEDICINE | Admitting: EMERGENCY MEDICINE

## 2018-05-31 ENCOUNTER — PROCEDURE VISIT (OUTPATIENT)
Dept: OBSTETRICS AND GYNECOLOGY | Age: 33
End: 2018-05-31

## 2018-05-31 ENCOUNTER — OFFICE VISIT (OUTPATIENT)
Dept: OBSTETRICS AND GYNECOLOGY | Age: 33
End: 2018-05-31

## 2018-05-31 VITALS
WEIGHT: 182 LBS | OXYGEN SATURATION: 100 % | HEIGHT: 68 IN | SYSTOLIC BLOOD PRESSURE: 116 MMHG | TEMPERATURE: 97.8 F | BODY MASS INDEX: 27.58 KG/M2 | RESPIRATION RATE: 14 BRPM | DIASTOLIC BLOOD PRESSURE: 79 MMHG | HEART RATE: 70 BPM

## 2018-05-31 VITALS — SYSTOLIC BLOOD PRESSURE: 124 MMHG | DIASTOLIC BLOOD PRESSURE: 70 MMHG

## 2018-05-31 DIAGNOSIS — S43.401A SPRAIN OF RIGHT SHOULDER, UNSPECIFIED SHOULDER SPRAIN TYPE, INITIAL ENCOUNTER: Primary | ICD-10-CM

## 2018-05-31 DIAGNOSIS — R10.2 PELVIC PAIN: Primary | ICD-10-CM

## 2018-05-31 DIAGNOSIS — N83.201 CYST OF RIGHT OVARY: Primary | ICD-10-CM

## 2018-05-31 DIAGNOSIS — N83.201 CYST OF RIGHT OVARY: ICD-10-CM

## 2018-05-31 LAB
BILIRUB BLD-MCNC: NEGATIVE MG/DL
CLARITY, POC: CLEAR
COLOR UR: YELLOW
GLUCOSE UR STRIP-MCNC: NEGATIVE MG/DL
KETONES UR QL: NEGATIVE
LEUKOCYTE EST, POC: NEGATIVE
NITRITE UR-MCNC: NEGATIVE MG/ML
PH UR: 6 [PH] (ref 5–8)
PROT UR STRIP-MCNC: NEGATIVE MG/DL
RBC # UR STRIP: NEGATIVE /UL
SP GR UR: 1.02 (ref 1–1.03)
UROBILINOGEN UR QL: NORMAL

## 2018-05-31 PROCEDURE — 99282 EMERGENCY DEPT VISIT SF MDM: CPT | Performed by: PHYSICIAN ASSISTANT

## 2018-05-31 PROCEDURE — 99213 OFFICE O/P EST LOW 20 MIN: CPT | Performed by: OBSTETRICS & GYNECOLOGY

## 2018-05-31 PROCEDURE — 99283 EMERGENCY DEPT VISIT LOW MDM: CPT

## 2018-05-31 PROCEDURE — 76830 TRANSVAGINAL US NON-OB: CPT | Performed by: OBSTETRICS & GYNECOLOGY

## 2018-05-31 RX ORDER — METHOCARBAMOL 750 MG/1
750 TABLET, FILM COATED ORAL 3 TIMES DAILY PRN
Qty: 20 TABLET | Refills: 0 | Status: SHIPPED | OUTPATIENT
Start: 2018-05-31 | End: 2018-12-20

## 2018-05-31 RX ORDER — CYCLOBENZAPRINE HCL 10 MG
10 TABLET ORAL ONCE
Status: COMPLETED | OUTPATIENT
Start: 2018-05-31 | End: 2018-05-31

## 2018-05-31 RX ORDER — NAPROXEN 375 MG/1
375 TABLET ORAL 2 TIMES DAILY PRN
Qty: 20 TABLET | Refills: 0 | Status: SHIPPED | OUTPATIENT
Start: 2018-05-31 | End: 2018-12-20

## 2018-05-31 RX ORDER — IBUPROFEN 400 MG/1
800 TABLET ORAL ONCE
Status: COMPLETED | OUTPATIENT
Start: 2018-05-31 | End: 2018-05-31

## 2018-05-31 RX ADMIN — CYCLOBENZAPRINE HYDROCHLORIDE 10 MG: 10 TABLET, FILM COATED ORAL at 20:32

## 2018-05-31 RX ADMIN — IBUPROFEN 800 MG: 400 TABLET ORAL at 20:32
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
## 2018-06-01 NOTE — ED NOTES
Educated pt on medications, home care, follow-up care, and reasons to return to ER. Patient verbalized understanding. Patient ambulatory from ER with spouse     Ivonne Olmedo RN  05/31/18 [id number]
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
## 2018-07-02 ENCOUNTER — OFFICE VISIT (OUTPATIENT)
Dept: OBSTETRICS AND GYNECOLOGY | Age: 33
End: 2018-07-02

## 2018-07-02 ENCOUNTER — TELEPHONE (OUTPATIENT)
Dept: OBSTETRICS AND GYNECOLOGY | Age: 33
End: 2018-07-02

## 2018-07-02 ENCOUNTER — PROCEDURE VISIT (OUTPATIENT)
Dept: OBSTETRICS AND GYNECOLOGY | Age: 33
End: 2018-07-02

## 2018-07-02 VITALS — DIASTOLIC BLOOD PRESSURE: 78 MMHG | SYSTOLIC BLOOD PRESSURE: 132 MMHG

## 2018-07-02 DIAGNOSIS — N92.0 MENORRHAGIA WITH REGULAR CYCLE: Primary | ICD-10-CM

## 2018-07-02 DIAGNOSIS — N83.201 CYST OF RIGHT OVARY: ICD-10-CM

## 2018-07-02 DIAGNOSIS — N83.201 CYST OF RIGHT OVARY: Primary | ICD-10-CM

## 2018-07-02 PROCEDURE — 99213 OFFICE O/P EST LOW 20 MIN: CPT | Performed by: OBSTETRICS & GYNECOLOGY

## 2018-07-02 PROCEDURE — 76830 TRANSVAGINAL US NON-OB: CPT | Performed by: OBSTETRICS & GYNECOLOGY
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
## 2018-07-06 ENCOUNTER — OFFICE VISIT (OUTPATIENT)
Dept: OBSTETRICS AND GYNECOLOGY | Age: 33
End: 2018-07-06

## 2018-07-06 VITALS
DIASTOLIC BLOOD PRESSURE: 80 MMHG | HEIGHT: 68 IN | WEIGHT: 191 LBS | SYSTOLIC BLOOD PRESSURE: 120 MMHG | BODY MASS INDEX: 28.95 KG/M2

## 2018-07-06 DIAGNOSIS — N92.0 MENORRHAGIA WITH REGULAR CYCLE: Primary | ICD-10-CM

## 2018-07-06 PROCEDURE — 99213 OFFICE O/P EST LOW 20 MIN: CPT | Performed by: OBSTETRICS & GYNECOLOGY
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
## 2018-07-19 PROBLEM — N83.201 CYST OF RIGHT OVARY: Status: RESOLVED | Noted: 2018-07-19 | Resolved: 2018-07-19

## 2018-07-19 PROBLEM — N83.201 CYST OF RIGHT OVARY: Status: ACTIVE | Noted: 2018-07-19

## 2018-07-19 PROBLEM — N92.0 MENORRHAGIA WITH REGULAR CYCLE: Status: ACTIVE | Noted: 2018-07-19

## 2018-07-27 ENCOUNTER — TELEPHONE (OUTPATIENT)
Dept: OBSTETRICS AND GYNECOLOGY | Age: 33
End: 2018-07-27

## 2018-07-27 RX ORDER — NORGESTIMATE AND ETHINYL ESTRADIOL 0.25-0.035
1 KIT ORAL DAILY
Qty: 28 TABLET | Refills: 12 | Status: SHIPPED | OUTPATIENT
Start: 2018-07-27 | End: 2018-12-20

## 2018-07-27 NOTE — TELEPHONE ENCOUNTER
only thing I can do right now is to put on some type of hormone which either means the birth control pill or other estrogen.  If she would like something like that let me know.  Otherwise there is nothing to slow down today

## 2018-07-27 NOTE — TELEPHONE ENCOUNTER
Dr JOY pt came in last mo for heavy bleeding on period, we did US and Rx'd her something (pt does not remember) that she had a reaction to, and was told if this happens again to call. Pt started period yesterday and today changing super + tampon q 20 min and spilling over into pad. Please advise

## 2018-12-03 ENCOUNTER — TELEPHONE (OUTPATIENT)
Dept: OBSTETRICS AND GYNECOLOGY | Age: 33
End: 2018-12-03

## 2018-12-03 DIAGNOSIS — N92.6 IRREGULAR MENSES: Primary | ICD-10-CM

## 2018-12-03 NOTE — TELEPHONE ENCOUNTER
Pt called with + home pregnancy test. LMP 11/4/18. Pt states that she is high risk and that you may want to do labs this week. Also does she need to be seen sooner than 8 weeks?

## 2018-12-04 LAB — HCG INTACT+B SERPL-ACNC: 54.35 MIU/ML

## 2018-12-06 ENCOUNTER — TELEPHONE (OUTPATIENT)
Dept: OBSTETRICS AND GYNECOLOGY | Age: 33
End: 2018-12-06

## 2018-12-06 LAB — HCG INTACT+B SERPL-ACNC: 146.8 MIU/ML

## 2018-12-19 ENCOUNTER — TELEPHONE (OUTPATIENT)
Dept: OBSTETRICS AND GYNECOLOGY | Age: 33
End: 2018-12-19

## 2018-12-20 ENCOUNTER — PROCEDURE VISIT (OUTPATIENT)
Dept: OBSTETRICS AND GYNECOLOGY | Age: 33
End: 2018-12-20

## 2018-12-20 ENCOUNTER — OFFICE VISIT (OUTPATIENT)
Dept: OBSTETRICS AND GYNECOLOGY | Age: 33
End: 2018-12-20

## 2018-12-20 VITALS
BODY MASS INDEX: 29.86 KG/M2 | HEIGHT: 68 IN | DIASTOLIC BLOOD PRESSURE: 72 MMHG | WEIGHT: 197 LBS | SYSTOLIC BLOOD PRESSURE: 120 MMHG

## 2018-12-20 DIAGNOSIS — N96 RECURRENT PREGNANCY LOSS: Primary | ICD-10-CM

## 2018-12-20 DIAGNOSIS — O20.0 THREATENED ABORTION: ICD-10-CM

## 2018-12-20 DIAGNOSIS — O20.9 BLEEDING IN EARLY PREGNANCY: Primary | ICD-10-CM

## 2018-12-20 DIAGNOSIS — O36.80X0 ENCOUNTER TO DETERMINE FETAL VIABILITY OF PREGNANCY, SINGLE OR UNSPECIFIED FETUS: ICD-10-CM

## 2018-12-20 PROCEDURE — 99212 OFFICE O/P EST SF 10 MIN: CPT | Performed by: OBSTETRICS & GYNECOLOGY

## 2018-12-20 PROCEDURE — 76817 TRANSVAGINAL US OBSTETRIC: CPT | Performed by: OBSTETRICS & GYNECOLOGY

## 2018-12-20 RX ORDER — UBIDECARENONE 75 MG
50 CAPSULE ORAL DAILY
COMMUNITY
End: 2019-07-07

## 2018-12-20 NOTE — PROGRESS NOTES
"Subjective      Robert Beach is a 33 y.o. female. Robert reports bleeding and pelvic pain . She is not in acute distress. Ectopic risks: none.  Patient had some pink discharge yesterday.  The patient is taking Klonopin which she states she is taken for many years and she does not think she can stop abruptly.  Cycle length: regular.  Pregnancy testing: at home.  Pregnancy imaging: not done.  Blood type: O positive.  Other lab results: none.    The following portions of the patient's history were reviewed and updated as appropriate: allergies and current medications.    Review of Systems  Pertinent items are noted in HPI.     Objective      /72   Ht 172.7 cm (68\")   Wt 89.4 kg (197 lb)   LMP 2018   BMI 29.95 kg/m²     General: alert, appears stated age and cooperative   Heart:    Lungs:    Abdomen:    Vulva:    Vagina:    Cervix:    Uterus:    Adnexa:      Imaging  Limited office ultrasound: Ultrasound shows an intrauterine pregnancy with fetal cardiac activity at 114.  The left ovary measures 3 x 2 x 1.9 cm.       Assessment/Plan     Robert was seen today for follow-up.    Diagnoses and all orders for this visit:    Recurrent pregnancy loss  -     Factor 5 leiden  -     Anticardiolipin Ab, IgG/M, Qn  -     Lupus Anticoagulant Panel    Threatened   -     Factor 5 leiden  -     Anticardiolipin Ab, IgG/M, Qn  -     Lupus Anticoagulant Panel     I discussed with the patient that Klonopin is category D  medication I recommend that she stop immediately.  Patient declines.  She states she will try to wean off the medication.  She did stop her naproxen. I encouraged her not to take that medication in the future if she is trying for pregnancy.    Patient has a history of 2 previous miscarriages.  Recommend repeat ultrasound next week.  We will check and a Cardiolite been antibodies, lupus anticoagulant and factor V Leiden.  "

## 2018-12-24 ENCOUNTER — TELEPHONE (OUTPATIENT)
Dept: OBSTETRICS AND GYNECOLOGY | Age: 33
End: 2018-12-24

## 2018-12-24 NOTE — TELEPHONE ENCOUNTER
Pt called stated she was 6-7 weeks having pains in her lower abdomin. She has four kids and has had two miscarriages before. Pt called last week with spotting, she was seen on 12/20/18 and the baby had a heart beat. She is having heavy bleeding that is bright red and clots that are a half dollar size. Please advise.

## 2018-12-26 ENCOUNTER — OFFICE VISIT (OUTPATIENT)
Dept: OBSTETRICS AND GYNECOLOGY | Age: 33
End: 2018-12-26

## 2018-12-26 ENCOUNTER — TELEPHONE (OUTPATIENT)
Dept: OBSTETRICS AND GYNECOLOGY | Age: 33
End: 2018-12-26

## 2018-12-26 ENCOUNTER — PROCEDURE VISIT (OUTPATIENT)
Dept: OBSTETRICS AND GYNECOLOGY | Age: 33
End: 2018-12-26

## 2018-12-26 VITALS
DIASTOLIC BLOOD PRESSURE: 72 MMHG | SYSTOLIC BLOOD PRESSURE: 120 MMHG | BODY MASS INDEX: 30.92 KG/M2 | WEIGHT: 197 LBS | HEIGHT: 67 IN

## 2018-12-26 DIAGNOSIS — N96 RECURRENT PREGNANCY LOSS: ICD-10-CM

## 2018-12-26 DIAGNOSIS — O20.0 THREATENED ABORTION: Primary | ICD-10-CM

## 2018-12-26 DIAGNOSIS — O03.9 COMPLETE ABORTION: ICD-10-CM

## 2018-12-26 DIAGNOSIS — F41.8 MIXED ANXIETY AND DEPRESSIVE DISORDER: Primary | ICD-10-CM

## 2018-12-26 LAB
APTT HEX PL PPP: 8 SEC
APTT IMM NP PPP: NORMAL SEC
APTT PPP 1:1 SALINE: NORMAL SEC
APTT PPP: 27.7 SEC
B2 GLYCOPROT1 IGA SER-ACNC: <10 SAU
B2 GLYCOPROT1 IGG SER-ACNC: <10 SGU
B2 GLYCOPROT1 IGM SER-ACNC: <10 SMU
BASOPHILS # BLD AUTO: 0.02 10*3/MM3 (ref 0–0.2)
BASOPHILS NFR BLD AUTO: 0.2 % (ref 0–1.5)
CARDIOLIPIN IGG SER IA-ACNC: <10 GPL
CARDIOLIPIN IGG SER IA-ACNC: <9 GPL U/ML (ref 0–14)
CARDIOLIPIN IGM SER IA-ACNC: <10 MPL
CARDIOLIPIN IGM SER IA-ACNC: <9 MPL U/ML (ref 0–12)
CONFIRM DRVVT: NORMAL SEC
DRVVT SCREEN TO CONFIRM RATIO: NORMAL RATIO
EOSINOPHIL # BLD AUTO: 0.05 10*3/MM3 (ref 0–0.7)
EOSINOPHIL NFR BLD AUTO: 0.5 % (ref 0.3–6.2)
ERYTHROCYTE [DISTWIDTH] IN BLOOD BY AUTOMATED COUNT: 12.9 % (ref 11.7–13)
F5 GENE MUT ANL BLD/T: NORMAL
HCT VFR BLD AUTO: 37.4 % (ref 35.6–45.5)
HGB BLD-MCNC: 12.5 G/DL (ref 11.9–15.5)
IMM GRANULOCYTES # BLD: 0.02 10*3/MM3 (ref 0–0.03)
IMM GRANULOCYTES NFR BLD: 0.2 % (ref 0–0.5)
INR PPP: 1 RATIO
LA NT PLATELET PPP: 0 SEC
LA PPP-IMP: NORMAL
LYMPHOCYTES # BLD AUTO: 1.63 10*3/MM3 (ref 0.9–4.8)
LYMPHOCYTES NFR BLD AUTO: 17.9 % (ref 19.6–45.3)
MCH RBC QN AUTO: 29.3 PG (ref 26.9–32)
MCHC RBC AUTO-ENTMCNC: 33.4 G/DL (ref 32.4–36.3)
MCV RBC AUTO: 87.6 FL (ref 80.5–98.2)
MONOCYTES # BLD AUTO: 0.64 10*3/MM3 (ref 0.2–1.2)
MONOCYTES NFR BLD AUTO: 7 % (ref 5–12)
NEUTROPHILS # BLD AUTO: 6.76 10*3/MM3 (ref 1.9–8.1)
NEUTROPHILS NFR BLD AUTO: 74.4 % (ref 42.7–76)
PLATELET # BLD AUTO: 156 10*3/MM3 (ref 140–500)
PROTHROMBIN TIME: 10.5 SEC
RBC # BLD AUTO: 4.27 10*6/MM3 (ref 3.9–5.2)
SCREEN DRVVT: 30 SEC
THROMBIN TIME: 17.5 SEC
WBC # BLD AUTO: 9.1 10*3/MM3 (ref 4.5–10.7)

## 2018-12-26 PROCEDURE — 99213 OFFICE O/P EST LOW 20 MIN: CPT | Performed by: OBSTETRICS & GYNECOLOGY

## 2018-12-26 PROCEDURE — 76817 TRANSVAGINAL US OBSTETRIC: CPT | Performed by: OBSTETRICS & GYNECOLOGY

## 2018-12-26 RX ORDER — ESCITALOPRAM OXALATE 10 MG/1
10 TABLET ORAL DAILY
Qty: 30 TABLET | Refills: 11 | OUTPATIENT
Start: 2018-12-26 | End: 2019-07-07

## 2018-12-26 RX ORDER — HYDROCODONE BITARTRATE AND ACETAMINOPHEN 5; 325 MG/1; MG/1
1 TABLET ORAL EVERY 6 HOURS PRN
Qty: 8 TABLET | Refills: 0 | OUTPATIENT
Start: 2018-12-26 | End: 2019-07-07

## 2018-12-26 NOTE — PROGRESS NOTES
"Chief Complaint   Patient presents with   • Imaging Only     PT WORKED IN TODAY FOR POSSIBLE THREATENED AB. SHE STARTED BLEEDING HEAVILY MONDAY AND IS NOW PASSING LARGE CLOTS.     Subjective      Robert Beach is a 33 y.o. female. Robert reports bleeding since 12/24. She is not in acute distress. Ectopic risks: none.  She was seen last week with early pregnancy.  We did see cardiac activity.  The patient has had 4 children but 2 miscarriages and 1 termination in the past.  I did send off some blood work for recurrent pregnancy loss.  Blood work is still pending.  Patient did stop her Klonopin for her anxiety.  She would like to discuss other medication for anxiety.  She does not have a psychiatrist but would like to see one.  She is not taking any Motrin since she had a history of some thrombocytopenia during pregnancy.  She did take one of her mothers Vicodin and requests some pain medication for the cramping.    Cycle length: regular .  Pregnancy testing: at home.  Pregnancy imaging: postitve cardiac activity last week on u/s .  Blood type: O positive.  Other lab results: pending for recurrent pregnancy loss .    The following portions of the patient's history were reviewed and updated as appropriate: allergies, current medications, past family history, past medical history, past social history, past surgical history and problem list.    Review of Systems  Pertinent items are noted in HPI.     Objective      /72   Ht 170.2 cm (67\")   Wt 89.4 kg (197 lb)   LMP 11/04/2018   BMI 30.85 kg/m²     General: alert, appears stated age and cooperative   Heart:    Lungs:    Abdomen:    Vulva:    Vagina:    Cervix:    Uterus:    Adnexa:      Imaging  Limited office ultrasound: Complete AB with absence of the gestational sac seen on prior ultrasound last week      Assessment/Plan     Robert was seen today for imaging only.    Diagnoses and all orders for this visit:    Mixed anxiety and depressive disorder  -     " Ambulatory Referral to Psychiatry    Complete   -     Ambulatory Referral to Psychiatry    Recurrent pregnancy loss  -     Chromosome Analysis, Peripheral Blood  -     TSH    Other orders  -     escitalopram (LEXAPRO) 10 MG tablet; Take 1 tablet by mouth Daily.  -     HYDROcodone-acetaminophen (NORCO) 5-325 MG per tablet; Take 1 tablet by mouth Every 6 (Six) Hours As Needed for Moderate Pain .      patient is appropriately sad over the miscarriage.  She is not sure if she will pursue another pregnancy.  She has considered tubal ligation and once to talk it over with Dr. Patel in a few weeks.    She does have significant anxiety and depression.  She would like to start an SSRI.  I will send in a perception for Lexapro and refer her to psychiatry.    A small dose of Vicodin was prescribed    We will also check labs for recurrent pregnancy loss, previously sent off labs are still pending.    15-20 minutes were spent in face-to-face consultation with the patient.

## 2018-12-27 ENCOUNTER — TELEPHONE (OUTPATIENT)
Dept: OBSTETRICS AND GYNECOLOGY | Age: 33
End: 2018-12-27

## 2018-12-27 NOTE — TELEPHONE ENCOUNTER
----- Message from Wang Mckeon MD sent at 12/26/2018  9:45 PM EST -----  Please notify platlet count is normal. Pt had a miscarriage - please cancel her appt with me this week

## 2019-01-03 ENCOUNTER — TELEPHONE (OUTPATIENT)
Dept: OBSTETRICS AND GYNECOLOGY | Age: 34
End: 2019-01-03

## 2019-01-04 ENCOUNTER — APPOINTMENT (OUTPATIENT)
Dept: GENERAL RADIOLOGY | Facility: HOSPITAL | Age: 34
End: 2019-01-04

## 2019-01-04 ENCOUNTER — HOSPITAL ENCOUNTER (EMERGENCY)
Facility: HOSPITAL | Age: 34
Discharge: HOME | End: 2019-01-04
Attending: EMERGENCY MEDICINE | Admitting: EMERGENCY MEDICINE

## 2019-01-04 VITALS
DIASTOLIC BLOOD PRESSURE: 87 MMHG | OXYGEN SATURATION: 100 % | RESPIRATION RATE: 16 BRPM | WEIGHT: 190 LBS | HEIGHT: 68 IN | BODY MASS INDEX: 28.79 KG/M2 | HEART RATE: 58 BPM | TEMPERATURE: 98.7 F | SYSTOLIC BLOOD PRESSURE: 107 MMHG

## 2019-01-04 DIAGNOSIS — S40.012A CONTUSION OF LEFT SHOULDER, INITIAL ENCOUNTER: Primary | ICD-10-CM

## 2019-01-04 DIAGNOSIS — M54.32 SCIATICA OF LEFT SIDE: ICD-10-CM

## 2019-01-04 DIAGNOSIS — S39.012A LUMBOSACRAL STRAIN, INITIAL ENCOUNTER: ICD-10-CM

## 2019-01-04 PROCEDURE — 73030 X-RAY EXAM OF SHOULDER: CPT

## 2019-01-04 PROCEDURE — 72100 X-RAY EXAM L-S SPINE 2/3 VWS: CPT

## 2019-01-04 PROCEDURE — 99282 EMERGENCY DEPT VISIT SF MDM: CPT | Performed by: EMERGENCY MEDICINE

## 2019-01-04 PROCEDURE — 72170 X-RAY EXAM OF PELVIS: CPT

## 2019-01-04 PROCEDURE — 99283 EMERGENCY DEPT VISIT LOW MDM: CPT

## 2019-01-04 RX ORDER — TRAMADOL HYDROCHLORIDE 50 MG/1
100 TABLET ORAL ONCE
Status: COMPLETED | OUTPATIENT
Start: 2019-01-04 | End: 2019-01-04

## 2019-01-04 RX ORDER — CHLORAL HYDRATE 500 MG
500 CAPSULE ORAL
COMMUNITY
End: 2019-07-07

## 2019-01-04 RX ORDER — MULTIPLE VITAMINS W/ MINERALS TAB 9MG-400MCG
1 TAB ORAL DAILY
COMMUNITY
End: 2019-07-07

## 2019-01-04 RX ORDER — CYCLOBENZAPRINE HCL 10 MG
10 TABLET ORAL 3 TIMES DAILY PRN
Qty: 24 TABLET | Refills: 0 | OUTPATIENT
Start: 2019-01-04 | End: 2019-07-07

## 2019-01-04 RX ORDER — DICLOFENAC SODIUM 75 MG/1
75 TABLET, DELAYED RELEASE ORAL 2 TIMES DAILY PRN
Qty: 20 TABLET | Refills: 0 | OUTPATIENT
Start: 2019-01-04 | End: 2019-07-07

## 2019-01-04 RX ORDER — CYCLOBENZAPRINE HCL 10 MG
10 TABLET ORAL ONCE
Status: DISCONTINUED | OUTPATIENT
Start: 2019-01-04 | End: 2019-01-05 | Stop reason: HOSPADM

## 2019-01-04 RX ORDER — TRAMADOL HYDROCHLORIDE 50 MG/1
TABLET ORAL
Qty: 24 TABLET | Refills: 0 | OUTPATIENT
Start: 2019-01-04 | End: 2019-10-21

## 2019-01-04 RX ADMIN — TRAMADOL HYDROCHLORIDE 50 MG: 50 TABLET, FILM COATED ORAL at 22:27

## 2019-01-05 NOTE — NURSING NOTE
Before giving pt tramadol, pt stated that she had a ride coming to get her and would be there in 10 minutes. Pt waiting in room for ride.
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
## 2019-01-11 ENCOUNTER — TELEPHONE (OUTPATIENT)
Dept: OBSTETRICS AND GYNECOLOGY | Age: 34
End: 2019-01-11

## 2019-01-11 NOTE — TELEPHONE ENCOUNTER
Overdue order for:   Chromosome Analysis  TSH  ..........  Checked with [facility] - still pending.

## 2019-01-14 LAB
CELLS ANALYZED: 20
CELLS COUNTED: 20
CELLS KARYOTYPED.TOTAL BLD/T: 2
CLINICAL CYTOGENETICIST SPEC: NORMAL
DIAGNOSTIC IMP SPEC-IMP: NORMAL
ISCN BAND LEVEL QL: 500
KARYOTYP BLD/T: NORMAL
SPECIMEN SOURCE: NORMAL
TSH SERPL DL<=0.005 MIU/L-ACNC: 1.76 MIU/ML (ref 0.27–4.2)

## 2019-01-15 ENCOUNTER — TELEPHONE (OUTPATIENT)
Dept: OBSTETRICS AND GYNECOLOGY | Age: 34
End: 2019-01-15

## 2019-01-15 NOTE — TELEPHONE ENCOUNTER
----- Message from Wang Mckeon MD sent at 1/14/2019  5:31 PM EST -----  Please notify karyotype test and TSH are normal.

## 2019-02-06 ENCOUNTER — PREP FOR SURGERY (OUTPATIENT)
Dept: OTHER | Facility: HOSPITAL | Age: 34
End: 2019-02-06

## 2019-02-06 ENCOUNTER — OFFICE VISIT (OUTPATIENT)
Dept: OBSTETRICS AND GYNECOLOGY | Age: 34
End: 2019-02-06

## 2019-02-06 VITALS
WEIGHT: 192 LBS | SYSTOLIC BLOOD PRESSURE: 110 MMHG | DIASTOLIC BLOOD PRESSURE: 70 MMHG | BODY MASS INDEX: 29.1 KG/M2 | HEIGHT: 68 IN

## 2019-02-06 DIAGNOSIS — Z30.09 STERILIZATION CONSULT: ICD-10-CM

## 2019-02-06 DIAGNOSIS — Z30.2 ADMISSION FOR STERILIZATION: Primary | ICD-10-CM

## 2019-02-06 DIAGNOSIS — N92.0 MENORRHAGIA WITH REGULAR CYCLE: ICD-10-CM

## 2019-02-06 DIAGNOSIS — Z00.00 ANNUAL PHYSICAL EXAM: Primary | ICD-10-CM

## 2019-02-06 PROCEDURE — 99395 PREV VISIT EST AGE 18-39: CPT | Performed by: OBSTETRICS & GYNECOLOGY

## 2019-02-06 RX ORDER — SODIUM CHLORIDE 0.9 % (FLUSH) 0.9 %
3-10 SYRINGE (ML) INJECTION AS NEEDED
Status: CANCELLED | OUTPATIENT
Start: 2019-02-06

## 2019-02-06 RX ORDER — SODIUM CHLORIDE 0.9 % (FLUSH) 0.9 %
3 SYRINGE (ML) INJECTION EVERY 12 HOURS SCHEDULED
Status: CANCELLED | OUTPATIENT
Start: 2019-02-06
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
## 2019-06-14 ENCOUNTER — TELEPHONE (OUTPATIENT)
Dept: OBSTETRICS AND GYNECOLOGY | Age: 34
End: 2019-06-14

## 2019-07-10 ENCOUNTER — PROCEDURE VISIT (OUTPATIENT)
Dept: OBSTETRICS AND GYNECOLOGY | Age: 34
End: 2019-07-10

## 2019-07-10 ENCOUNTER — OFFICE VISIT (OUTPATIENT)
Dept: OBSTETRICS AND GYNECOLOGY | Age: 34
End: 2019-07-10

## 2019-07-10 VITALS
BODY MASS INDEX: 29.25 KG/M2 | WEIGHT: 193 LBS | HEIGHT: 68 IN | DIASTOLIC BLOOD PRESSURE: 74 MMHG | SYSTOLIC BLOOD PRESSURE: 118 MMHG

## 2019-07-10 DIAGNOSIS — Z30.430 ENCOUNTER FOR INSERTION OF COPPER IUD: ICD-10-CM

## 2019-07-10 DIAGNOSIS — Z30.431 IUD CHECK UP: Primary | ICD-10-CM

## 2019-07-10 DIAGNOSIS — R35.0 URINE FREQUENCY: Primary | ICD-10-CM

## 2019-07-10 LAB
B-HCG UR QL: NEGATIVE
BILIRUB BLD-MCNC: NEGATIVE MG/DL
CLARITY, POC: CLEAR
COLOR UR: YELLOW
GLUCOSE UR STRIP-MCNC: NEGATIVE MG/DL
INTERNAL NEGATIVE CONTROL: NEGATIVE
INTERNAL POSITIVE CONTROL: POSITIVE
KETONES UR QL: NEGATIVE
LEUKOCYTE EST, POC: ABNORMAL
Lab: NORMAL
NITRITE UR-MCNC: NEGATIVE MG/ML
PH UR: 6 [PH] (ref 5–8)
PROT UR STRIP-MCNC: NEGATIVE MG/DL
RBC # UR STRIP: ABNORMAL /UL
SP GR UR: 1.02 (ref 1–1.03)
UROBILINOGEN UR QL: NORMAL

## 2019-07-10 PROCEDURE — 58300 INSERT INTRAUTERINE DEVICE: CPT | Performed by: PHYSICIAN ASSISTANT

## 2019-07-10 PROCEDURE — 81025 URINE PREGNANCY TEST: CPT | Performed by: PHYSICIAN ASSISTANT

## 2019-07-10 RX ORDER — COPPER 313.4 MG/1
INTRAUTERINE DEVICE INTRAUTERINE ONCE
Status: COMPLETED | OUTPATIENT
Start: 2019-07-10 | End: 2019-07-10

## 2019-07-10 RX ADMIN — COPPER 1 EACH: 313.4 INTRAUTERINE DEVICE INTRAUTERINE at 12:10

## 2019-07-10 NOTE — PROGRESS NOTES
IUD Insertion    Patient's last menstrual period was 07/07/2019 (exact date).    Date of procedure:  7/10/2019    Risks and benefits discussed? yes  All questions answered? yes  Consents given by The patient  Written consent obtained? yes    Procedure documentation:     Urine pregnancy test was done toda and result was negative.  The risks (including infection, bleeding, pain, and uterine perforation) and benefits of the procedure were explained to the patient and Written informed consent was  obtained.    After verifying the patient had a low probability of being pregnant and met the criteria for insertion, a sterile speculum has placed and the cervix was cleansed with an antiseptic solution.  Vaginal discharge was scant.  The anterior lip of the cervix was grasped with a tenaculum and the uterine cavity was gently sounded. There was no difficulty passing the sound through the cervix.  Cervical dilation did not need to be performed prior to placing the IUD.  The uterus was retroverted and sounded to 9 cms.  The ParaGard was then prepared per the manufacturers instructions.    The Paraguard was advanced through the cervical canal until the upper edge of the flange was flush with the external cervix.  The insertion yin was held in place while the insertion tube was withdrawn.  I waited 10-15 seconds for the arms of the IUS to fully open and the devise to seat in the cavity.  The yin was removed first followed by the insertion tube.. The string was cut 2 cms in length.  Bleeding from the cervix was scant.    She tolerated the procedure without any difficulty.    Post procedure instructions: The patient was advised to call for any fever or for prolonged or severe pain or bleeding. Pelvic rest advised for 2 wks    Follow up needed: 6 weeks for IUD check    IUD in place per u/s.  Right follicle noted that measures 9.6 x 8.1 mm

## 2019-07-12 LAB
BACTERIA UR CULT: NORMAL
BACTERIA UR CULT: NORMAL

## 2019-09-27 ENCOUNTER — TELEPHONE (OUTPATIENT)
Dept: OBSTETRICS AND GYNECOLOGY | Age: 34
End: 2019-09-27

## 2019-09-28 NOTE — TELEPHONE ENCOUNTER
Pt called because today she realized that she had a retained tampon for approximately three days. She was able to remove the tampon herself and it appeared intact. Questioned if she should come to the ED, she had no abdominal pain, fever, and felt well otherwise. Discussed could complete a course of flagyl more so prophylactically, and she already had flagyl from another physician. To complete the course and monitor for any s/sx of pelvic infection.    Juliana Juarez MD  9/27/2019  11:37 PM

## 2019-10-24 ENCOUNTER — OFFICE VISIT (OUTPATIENT)
Dept: OBSTETRICS AND GYNECOLOGY | Age: 34
End: 2019-10-24

## 2019-10-24 ENCOUNTER — PROCEDURE VISIT (OUTPATIENT)
Dept: OBSTETRICS AND GYNECOLOGY | Age: 34
End: 2019-10-24

## 2019-10-24 VITALS
DIASTOLIC BLOOD PRESSURE: 72 MMHG | BODY MASS INDEX: 30.77 KG/M2 | WEIGHT: 203 LBS | SYSTOLIC BLOOD PRESSURE: 112 MMHG | HEIGHT: 68 IN

## 2019-10-24 DIAGNOSIS — N89.8 VAGINAL LESION: ICD-10-CM

## 2019-10-24 DIAGNOSIS — R10.2 PELVIC PAIN: Primary | ICD-10-CM

## 2019-10-24 DIAGNOSIS — Z11.3 SCREEN FOR STD (SEXUALLY TRANSMITTED DISEASE): Primary | ICD-10-CM

## 2019-10-24 PROCEDURE — 99213 OFFICE O/P EST LOW 20 MIN: CPT | Performed by: PHYSICIAN ASSISTANT

## 2019-10-24 PROCEDURE — 76830 TRANSVAGINAL US NON-OB: CPT | Performed by: OBSTETRICS & GYNECOLOGY

## 2019-10-24 NOTE — PROGRESS NOTES
"Subjective     Chief Complaint   Patient presents with   • Menstrual Problem     c/o irregular bleeding with iud, also having abdominal pain,  (would also like for you to check a spot on inside of labia)       Robert Beach is a 34 y.o.  whose LMP is No LMP recorded. Patient is not currently having periods (Reason: Other). presents with right sided pelvic pain    She noted it on Monday  It has since improved  She does have an IUD in place  Here for u/s for further eval    Also noted a lesion on the right labia  Feels rough, non tender  H/o HPV and cryo for abnormal pap  No new partners but would like serum std testing    Pt of Dr Barth  New to me with t his concern    No Additional Complaints Reported    The following portions of the patient's history were reviewed and updated as appropriate:vital signs, allergies, current medications, past family history, past medical history, past social history, past surgical history and problem list      Review of Systems   Genitourinary:positive for genital lesions and pelvic pain     Objective      /72   Ht 172.7 cm (68\")   Wt 92.1 kg (203 lb)   Breastfeeding? No   BMI 30.87 kg/m²     Physical Exam   Genitourinary:         Genitourinary Comments: Raised, rough tissue noted on labia minora on right.? Wart, small sample obtained and sent off for further eval       General:   alert, comfortable and no distress   Heart: Not performed today   Lungs: Not performed today.   Breast: Not performed today   Neck: na   Abdomen: {Not performed today   CVA: Not performed today   Pelvis: External genitalia: warts? See pic  Vaginal: normal mucosa without prolapse or lesions   Extremities: Not performed today   Neurologic: negative   Psychiatric: Normal affect, judgement, and mood       Lab Review   Labs: No data reviewed     Imaging   Ultrasound - Pelvic Vaginal endo thickness measures 7.16 mm. B ovaries wnl. IUD in place    Assessment/Plan     ASSESSMENT  1. Screen for STD " (sexually transmitted disease)    2. Vaginal lesion        PLAN  1.   Orders Placed This Encounter   Procedures   • RPR, Rfx Qn RPR / Confirm TP   • Hepatitis B Surface Antigen   • Hepatitis C Antibody   • HIV-1 / O / 2 Ag / Antibody 4th Generation   • HSV 1 & 2 - Specific Antibody, IgG       2. Send off sample of vaginal lesion for confirmation. Suspect wart. Can treat based on results.    Pt requests serum std testing as well    Follow up: 3 month(s)    PERICO Cardona  10/24/2019

## 2019-10-25 LAB
HBV SURFACE AG SERPL QL IA: NEGATIVE
HCV AB S/CO SERPL IA: 0.1 S/CO RATIO (ref 0–0.9)
HIV 1+2 AB+HIV1 P24 AG SERPL QL IA: NON REACTIVE
HSV1 IGG SER IA-ACNC: 19.6 INDEX (ref 0–0.9)
HSV2 IGG SER IA-ACNC: 1.35 INDEX (ref 0–0.9)
HSV2 IGG SERPL QL IA: POSITIVE
RPR SER QL: NON REACTIVE

## 2019-10-28 ENCOUNTER — TELEPHONE (OUTPATIENT)
Dept: OBSTETRICS AND GYNECOLOGY | Age: 34
End: 2019-10-28

## 2019-10-28 NOTE — TELEPHONE ENCOUNTER
----- Message from PERICO Rodriguez sent at 10/28/2019  3:04 PM EDT -----  Let her know her hiv, syphillis, hep b and hep c are negative. Her herpes results are positive for exposure, she has a known h/o this so these results are not unexpected. Let me know if she has questions about this

## 2019-10-30 LAB
DX ICD CODE: NORMAL
PATH REPORT.FINAL DX SPEC: NORMAL
PATH REPORT.GROSS SPEC: NORMAL
PATH REPORT.SITE OF ORIGIN SPEC: NORMAL
PATHOLOGIST NAME: NORMAL
PAYMENT PROCEDURE: NORMAL

## 2019-12-10 ENCOUNTER — OFFICE VISIT (OUTPATIENT)
Dept: OBSTETRICS AND GYNECOLOGY | Age: 34
End: 2019-12-10

## 2019-12-10 VITALS
WEIGHT: 197 LBS | DIASTOLIC BLOOD PRESSURE: 68 MMHG | SYSTOLIC BLOOD PRESSURE: 112 MMHG | BODY MASS INDEX: 29.86 KG/M2 | HEIGHT: 68 IN

## 2019-12-10 DIAGNOSIS — E04.1 THYROID NODULE: Primary | ICD-10-CM

## 2019-12-10 DIAGNOSIS — Z30.432 ENCOUNTER FOR IUD REMOVAL: ICD-10-CM

## 2019-12-10 DIAGNOSIS — N92.6 IRREGULAR BLEEDING: ICD-10-CM

## 2019-12-10 DIAGNOSIS — R45.86 MOOD CHANGES: ICD-10-CM

## 2019-12-10 PROCEDURE — 99213 OFFICE O/P EST LOW 20 MIN: CPT | Performed by: PHYSICIAN ASSISTANT

## 2019-12-10 PROCEDURE — 58301 REMOVE INTRAUTERINE DEVICE: CPT | Performed by: PHYSICIAN ASSISTANT

## 2019-12-10 RX ORDER — BUPROPION HYDROCHLORIDE 150 MG/1
150 TABLET, EXTENDED RELEASE ORAL 2 TIMES DAILY
Qty: 60 TABLET | Refills: 2 | Status: SHIPPED | OUTPATIENT
Start: 2019-12-10 | End: 2020-03-14

## 2019-12-10 RX ORDER — ETONOGESTREL AND ETHINYL ESTRADIOL 11.7; 2.7 MG/1; MG/1
1 INSERT, EXTENDED RELEASE VAGINAL
Qty: 1 EACH | Refills: 0 | COMMUNITY
Start: 2019-12-10 | End: 2020-03-14

## 2019-12-10 NOTE — PROGRESS NOTES
"IUD Removal    Date of procedure:  12/10/2019    Risks and benefits discussed? yes  All questions answered? yes  Consents given by The patient  Reason for removal: Side effect: irregular, persisten bleeding        Procedure documentation:    A speculum was placed in order to view the cervix.  .  The IUD string was easily seen.  The string was grasped and the IUD was removed without difficulty.  The IUD did not appear to be adherent to the uterine cavity. It was removed intact.    She tolerated the procedure without any difficulty.     Post procedure instructions: Patient notified to call with heavy bleeding, fever or increasing pain.    Follow up needed: 2 month(s) for annual exam        Pt would like to have an ablation and a tubal.  Has already discussed this with Dr Barth would like to move forward with this.  Will check with him to see if she can move forward with scheduling it prior to her annual exam. Is agreeable to starting nuva ring for now.  Sample given.     She is interested in getting on something for anti depression. Feels \"crazy\". Does take clonazepam prn anxiety and had a recent episode that caused her to go to the ER.  She is not SI or HI.  She has been on lexapro before but felt like it made her feel worse. Is open to something different. Will try wellbutrin.  Also discussed self care and counseling.  Pt does have a lot of stress r/t ill parents and a son that is acting up (he is 16 yoa).  Would consider counseling but unsure if she can make the time for it. Also encouraged Vitamin D           Subjective     Chief Complaint   Patient presents with   • Follow-up     GYN wants IUD removed       Robert Beach is a 34 y.o.  whose LMP is No LMP recorded. (Menstrual status: Other). presents for iud removal  Persistent and irregular bleeding   Also dealing with mood changes and irritability  (see additional details above)      No Additional Complaints Reported    The following portions of the " "patient's history were reviewed and updated as appropriate:vital signs, allergies, current medications, past family history, past medical history, past social history, past surgical history and problem list      Review of Systems   Genitourinary:positive for abnormal menstrual periods   Mood changes    Objective      /68   Ht 172.7 cm (68\")   Wt 89.4 kg (197 lb)   Breastfeeding No   BMI 29.95 kg/m²     Physical Exam    General:   alert, comfortable and no distress   Heart: Not performed today   Lungs: Not performed today.   Breast: Not performed today   Neck: na   Abdomen: {Not performed today   CVA: Not performed today   Pelvis: External genitalia: normal general appearance  Cervix: IUD string visualized, and removed, see note above   Extremities: Not performed today   Neurologic: negative   Psychiatric: Normal affect, judgement, and mood       Lab Review   Labs: No data reviewed     Imaging   No data reviewed    Assessment/Plan     ASSESSMENT  1. Thyroid nodule    2. Mood changes    3. Irregular bleeding    4. Encounter for IUD removal        PLAN  1.   Orders Placed This Encounter   Procedures   • TSH       2. Medications prescribed this encounter:        New Medications Ordered This Visit   Medications   • buPROPion SR (WELLBUTRIN SR) 150 MG 12 hr tablet     Sig: Take 1 tablet by mouth 2 (Two) Times a Day.     Dispense:  60 tablet     Refill:  2   • etonogestrel-ethinyl estradiol (NUVARING) 0.12-0.015 MG/24HR vaginal ring     Sig: Insert 1 each into the vagina Every 28 (Twenty-Eight) Days. Insert pv for 3 wks, then remove for 1 wk     Dispense:  1 each     Refill:  0     Order Specific Question:   Lot Number?     Answer:   [id number]     Order Specific Question:   Expiration Date?     Answer:   10/20/2021     Order Specific Question:   ?     Answer:   [employer] [[id number]]     Order Specific Question:   Quantity     Answer:   1       3. Will start nuva ring for now.  Plan f/u with dr Barth " for possible ablation and tubal.  Will start wellbutrin for mood changes as well    Follow up: PERICO Villarreal  12/18/2019

## 2019-12-11 ENCOUNTER — TELEPHONE (OUTPATIENT)
Dept: OBSTETRICS AND GYNECOLOGY | Age: 34
End: 2019-12-11

## 2019-12-11 LAB — TSH SERPL DL<=0.005 MIU/L-ACNC: 2.51 UIU/ML (ref 0.27–4.2)

## 2019-12-11 NOTE — TELEPHONE ENCOUNTER
----- Message from PERICO Rodriguez sent at 12/10/2019  5:12 PM EST -----  Please let pt know he would need to consult with her prior to the tubal/ablation. Can schedule consult appt when convenient to pt.  ----- Message -----  From: Raymon Barth MD  Sent: 12/10/2019   4:39 PM EST  To: PERICO Rodriguez    Yes I would need to consult.  Please tell your people to talk to my people  ----- Message -----  From: Emely Mcmahan PA  Sent: 12/10/2019   3:24 PM EST  To: Raymon Barth MD    Pt seen today for IUD removal. She would like to proceed with ablation and tubal. Are you able to schedule this after the new year and would you need a consult first?

## 2019-12-12 ENCOUNTER — TELEPHONE (OUTPATIENT)
Dept: OBSTETRICS AND GYNECOLOGY | Age: 34
End: 2019-12-12

## 2019-12-12 NOTE — TELEPHONE ENCOUNTER
----- Message from PERICO Rodriguez sent at 12/11/2019  8:57 AM EST -----  Let her know her thyroid is wnl

## 2020-02-10 ENCOUNTER — OFFICE VISIT (OUTPATIENT)
Dept: OBSTETRICS AND GYNECOLOGY | Age: 35
End: 2020-02-10

## 2020-02-10 VITALS
BODY MASS INDEX: 30.01 KG/M2 | SYSTOLIC BLOOD PRESSURE: 120 MMHG | DIASTOLIC BLOOD PRESSURE: 80 MMHG | WEIGHT: 198 LBS | HEIGHT: 68 IN

## 2020-02-10 DIAGNOSIS — Z20.2 EXPOSURE TO STD: ICD-10-CM

## 2020-02-10 DIAGNOSIS — Z12.4 ROUTINE CERVICAL SMEAR: ICD-10-CM

## 2020-02-10 DIAGNOSIS — Z00.00 ANNUAL PHYSICAL EXAM: ICD-10-CM

## 2020-02-10 DIAGNOSIS — Z13.89 SCREENING FOR BLOOD OR PROTEIN IN URINE: Primary | ICD-10-CM

## 2020-02-10 DIAGNOSIS — Z11.51 SPECIAL SCREENING EXAMINATION FOR HUMAN PAPILLOMAVIRUS (HPV): ICD-10-CM

## 2020-02-10 PROBLEM — R19.8 TEETH CLENCHING: Status: ACTIVE | Noted: 2019-12-11

## 2020-02-10 PROBLEM — F41.9 ANXIETY: Status: ACTIVE | Noted: 2019-12-11

## 2020-02-10 PROBLEM — M51.36 DDD (DEGENERATIVE DISC DISEASE), LUMBAR: Status: ACTIVE | Noted: 2019-12-11

## 2020-02-10 PROBLEM — E04.1 THYROID NODULE: Status: ACTIVE | Noted: 2019-12-17

## 2020-02-10 PROBLEM — G43.909 MIGRAINES: Status: ACTIVE | Noted: 2019-12-11

## 2020-02-10 PROBLEM — I49.3 PVC (PREMATURE VENTRICULAR CONTRACTION): Status: ACTIVE | Noted: 2019-12-11

## 2020-02-10 PROBLEM — M51.369 DDD (DEGENERATIVE DISC DISEASE), LUMBAR: Status: ACTIVE | Noted: 2019-12-11

## 2020-02-10 LAB
BILIRUB BLD-MCNC: NEGATIVE MG/DL
CLARITY, POC: CLEAR
COLOR UR: YELLOW
GLUCOSE UR STRIP-MCNC: NEGATIVE MG/DL
KETONES UR QL: NEGATIVE
LEUKOCYTE EST, POC: NEGATIVE
NITRITE UR-MCNC: NEGATIVE MG/ML
PH UR: 7 [PH] (ref 5–8)
PROT UR STRIP-MCNC: NEGATIVE MG/DL
RBC # UR STRIP: NEGATIVE /UL
SP GR UR: 1.01 (ref 1–1.03)
UROBILINOGEN UR QL: NORMAL

## 2020-02-10 PROCEDURE — 99395 PREV VISIT EST AGE 18-39: CPT | Performed by: OBSTETRICS & GYNECOLOGY

## 2020-02-12 ENCOUNTER — TELEPHONE (OUTPATIENT)
Dept: OBSTETRICS AND GYNECOLOGY | Age: 35
End: 2020-02-12

## 2020-02-12 LAB
C TRACH RRNA CVX QL NAA+PROBE: NEGATIVE
CONV .: NORMAL
CYTOLOGIST CVX/VAG CYTO: NORMAL
CYTOLOGY CVX/VAG DOC CYTO: NORMAL
CYTOLOGY CVX/VAG DOC THIN PREP: NORMAL
DX ICD CODE: NORMAL
HIV 1 & 2 AB SER-IMP: NORMAL
N GONORRHOEA RRNA CVX QL NAA+PROBE: NEGATIVE
OTHER STN SPEC: NORMAL
PATHOLOGIST CVX/VAG CYTO: NORMAL
STAT OF ADQ CVX/VAG CYTO-IMP: NORMAL

## 2020-02-12 NOTE — TELEPHONE ENCOUNTER
----- Message from Raymon Barth MD sent at 2/12/2020 12:12 PM EST -----  Tell patient Pap and all the other testing was negative

## 2020-03-05 ENCOUNTER — TELEPHONE (OUTPATIENT)
Dept: OBSTETRICS AND GYNECOLOGY | Age: 35
End: 2020-03-05

## 2020-03-05 RX ORDER — FLUCONAZOLE 150 MG/1
150 TABLET ORAL ONCE
Qty: 2 TABLET | Refills: 1 | Status: SHIPPED | OUTPATIENT
Start: 2020-03-05 | End: 2020-03-05

## 2020-03-05 RX ORDER — ACYCLOVIR 400 MG/1
400 TABLET ORAL
Qty: 30 TABLET | Refills: 0 | Status: SHIPPED | OUTPATIENT
Start: 2020-03-05 | End: 2020-11-30 | Stop reason: SDUPTHER

## 2020-03-05 NOTE — TELEPHONE ENCOUNTER
Pt requests rx for Acyclovir and Diflucan.  Allergies and pharmacy updated.   Pt also has decided she wants to proceed with tubal.

## 2020-04-10 ENCOUNTER — OFFICE VISIT (OUTPATIENT)
Dept: OBSTETRICS AND GYNECOLOGY | Age: 35
End: 2020-04-10

## 2020-04-10 ENCOUNTER — TELEPHONE (OUTPATIENT)
Dept: OBSTETRICS AND GYNECOLOGY | Age: 35
End: 2020-04-10

## 2020-04-10 DIAGNOSIS — N92.6 IRREGULAR MENSES: Primary | ICD-10-CM

## 2020-04-10 PROCEDURE — 99442 PR PHYS/QHP TELEPHONE EVALUATION 11-20 MIN: CPT | Performed by: NURSE PRACTITIONER

## 2020-04-10 NOTE — TELEPHONE ENCOUNTER
Pt called, stated that she's been having irregular periods for the last three months, pt stated that her last regular period was around Feb, pt stated that she has lower abdomin and back pain. Pt also stated that she took a pregnancy test and it came back negative      Pt has concerns wanted to know if she could make an appointment.

## 2020-04-10 NOTE — TELEPHONE ENCOUNTER
Offer telephone visit today, otherwise I would wait to do an in person visit until after the current COVID concerns lessen.

## 2020-04-10 NOTE — PROGRESS NOTES
"Subjective   Robert Beach is a 34 y.o. female is being seen today for No chief complaint on file.  .    History of Present Illness       Patient has concerns regarding her last few cycles being different   They usually are very regular and last 4-5 days    March was 4 days late and she had spotting off and on for two weeks  This month her cycle is late as well, had some bleeding yesterday but has already stopped  She also feels bloated  Denies concerns for STDs  States she has had some \"thyroid issues\", she has a 1cm nodule on her thyroid and her levels were checked in January and believes they were normal but only TSH was checked  She also feels more tired than normal  She did check a pregnancy test in March but did not this month  Denies other concerns  She uses condoms for BC and states she is very sensitive to hormones and does not want anything else to help with irregular cycles    The following portions of the patient's history were reviewed and updated as appropriate: allergies, current medications, past family history, past medical history, past social history, past surgical history and problem list.    Patient's last menstrual period was 04/09/2020.      Review of Systems   Constitutional: Positive for fatigue.   HENT: Negative.    Eyes: Negative.    Respiratory: Negative.    Cardiovascular: Negative.    Gastrointestinal: Negative.    Endocrine: Negative.    Genitourinary: Positive for vaginal bleeding. Negative for vaginal discharge and vaginal pain.   Musculoskeletal: Negative.    Skin: Negative.    Allergic/Immunologic: Negative.    Neurological: Negative.    Hematological: Negative.    Psychiatric/Behavioral: Negative.  Negative for behavioral problems and dysphoric mood. The patient is not nervous/anxious.        Objective   Physical Exam      Assessment/Plan   Diagnoses and all orders for this visit:    Irregular menses  -     HCG, B-subunit, Quantitative  -     Thyroid Panel With TSH  -     CBC & " Differential      Discussed s/s with patient.  She would like to check some lab work today and requests a CBC as well as thyroid to be rechecked.  I will place those orders.    Although cycles are irregular for her I do not think she would need any further evaluation at this time.  She will observe cycles and let us know if they continue to be irregular.  If so, she can plan to come in once COVID precautions are lower and have a pelvic ultrasound and visit.  She declines any birth control right now. She plans to follow up with her PCP as well for her thyroid nodule.    I spent 15 minutes in discussion with her today regarding her symptoms and evaluation.

## 2020-04-11 LAB
BASOPHILS # BLD AUTO: NORMAL 10*3/UL
DIFFERENTIAL COMMENT: NORMAL
EOSINOPHIL # BLD AUTO: NORMAL 10*3/UL
EOSINOPHIL # BLD MANUAL: 0.08 10*3/MM3 (ref 0–0.4)
EOSINOPHIL NFR BLD AUTO: NORMAL %
EOSINOPHIL NFR BLD MANUAL: 1 % (ref 0.3–6.2)
ERYTHROCYTE [DISTWIDTH] IN BLOOD BY AUTOMATED COUNT: 12.5 % (ref 12.3–15.4)
FT4I SERPL CALC-MCNC: 1.7 (ref 1.2–4.9)
HCG INTACT+B SERPL-ACNC: <0.5 MIU/ML
HCT VFR BLD AUTO: 37.6 % (ref 34–46.6)
HGB BLD-MCNC: 12.2 G/DL (ref 12–15.9)
LYMPHOCYTES # BLD AUTO: NORMAL 10*3/UL
LYMPHOCYTES # BLD MANUAL: 1.62 10*3/MM3 (ref 0.7–3.1)
LYMPHOCYTES NFR BLD AUTO: NORMAL %
LYMPHOCYTES NFR BLD MANUAL: 21 % (ref 19.6–45.3)
MCH RBC QN AUTO: 28.1 PG (ref 26.6–33)
MCHC RBC AUTO-ENTMCNC: 32.4 G/DL (ref 31.5–35.7)
MCV RBC AUTO: 86.6 FL (ref 79–97)
MONOCYTES # BLD MANUAL: 0.39 10*3/MM3 (ref 0.1–0.9)
MONOCYTES NFR BLD AUTO: NORMAL %
MONOCYTES NFR BLD MANUAL: 5 % (ref 5–12)
NEUTROPHILS # BLD MANUAL: 5.62 10*3/MM3 (ref 1.7–7)
NEUTROPHILS NFR BLD AUTO: NORMAL %
NEUTROPHILS NFR BLD MANUAL: 73 % (ref 42.7–76)
PLATELET # BLD AUTO: 172 10*3/MM3 (ref 140–450)
PLATELET BLD QL SMEAR: NORMAL
RBC # BLD AUTO: 4.34 10*6/MM3 (ref 3.77–5.28)
RBC MORPH BLD: NORMAL
T3RU NFR SERPL: 26 % (ref 24–39)
T4 SERPL-MCNC: 6.6 UG/DL (ref 4.5–12)
TSH SERPL DL<=0.005 MIU/L-ACNC: 1.35 UIU/ML (ref 0.45–4.5)
WBC # BLD AUTO: 7.7 10*3/MM3 (ref 3.4–10.8)

## 2020-04-14 ENCOUNTER — TELEPHONE (OUTPATIENT)
Dept: OBSTETRICS AND GYNECOLOGY | Age: 35
End: 2020-04-14

## 2020-04-28 ENCOUNTER — TELEPHONE (OUTPATIENT)
Dept: OBSTETRICS AND GYNECOLOGY | Age: 35
End: 2020-04-28

## 2020-04-28 NOTE — TELEPHONE ENCOUNTER
(Lary pt) Pt called and has reocurring UTI.  Could you please call a prescription in to the pharmacy?    Pharmacy verified.    [phone]

## 2020-04-29 ENCOUNTER — OFFICE VISIT (OUTPATIENT)
Dept: OBSTETRICS AND GYNECOLOGY | Age: 35
End: 2020-04-29

## 2020-04-29 DIAGNOSIS — R31.9 URINARY TRACT INFECTION WITH HEMATURIA, SITE UNSPECIFIED: Primary | ICD-10-CM

## 2020-04-29 DIAGNOSIS — N39.0 URINARY TRACT INFECTION WITH HEMATURIA, SITE UNSPECIFIED: Primary | ICD-10-CM

## 2020-04-29 PROCEDURE — 99441 PR PHYS/QHP TELEPHONE EVALUATION 5-10 MIN: CPT | Performed by: PHYSICIAN ASSISTANT

## 2020-04-29 NOTE — PROGRESS NOTES
"Subjective     Chief Complaint   Patient presents with   • Urinary Tract Infection       Robert Beach is a 34 y.o.  whose LMP is Patient's last menstrual period was 2020. presents with uti sx's  She had been sick with a stomach bug and started with sx's of a uti  Bladder spasms and generally feeling unwell  Has only had 3 uti's \"in her life\"  Most recent one was a few months ago, likely r/t increased soda intake  Is working on increased water intake    Pt of Dr Barth  This was a telephone visit and pt consented to it    No Additional Complaints Reported    The following portions of the patient's history were reviewed and updated as appropriate:vital signs, allergies, current medications, past family history, past medical history, past social history, past surgical history and problem list      Review of Systems   Genitourinary:positive for dysuria     Objective      LMP 2020     Physical Exam    General:   no distress and phone call   Heart: Not performed today   Lungs: Not performed today.   Breast: Not performed today   Neck: na   Abdomen: {Not performed today   CVA: Not performed today   Pelvis: Not performed today   Extremities: Not performed today   Neurologic: negative   Psychiatric: Normal affect, judgement, and mood       Lab Review   Labs: Urinalysis - with micro     Imaging   No data reviewed    Assessment/Plan     ASSESSMENT  1. Urinary tract infection with hematuria, site unspecified        PLAN  1. Pt feeling better. Will c/w ab and get adequate hydration    She would like to proceed with a TL, message sent to Dr Barth in regards to this    Visit lasted approximately 10 minutes and 30 seconds    Follow up: PERICO Villarreal  2020           "

## 2020-11-17 ENCOUNTER — PROCEDURE VISIT (OUTPATIENT)
Dept: OBSTETRICS AND GYNECOLOGY | Age: 35
End: 2020-11-17

## 2020-11-17 ENCOUNTER — OFFICE VISIT (OUTPATIENT)
Dept: OBSTETRICS AND GYNECOLOGY | Age: 35
End: 2020-11-17

## 2020-11-17 VITALS
WEIGHT: 202 LBS | BODY MASS INDEX: 30.62 KG/M2 | DIASTOLIC BLOOD PRESSURE: 70 MMHG | HEIGHT: 68 IN | SYSTOLIC BLOOD PRESSURE: 120 MMHG

## 2020-11-17 DIAGNOSIS — N83.201 CYST OF RIGHT OVARY: Primary | ICD-10-CM

## 2020-11-17 DIAGNOSIS — R10.2 PELVIC PAIN: Primary | ICD-10-CM

## 2020-11-17 DIAGNOSIS — Z13.89 SCREENING FOR BLOOD OR PROTEIN IN URINE: ICD-10-CM

## 2020-11-17 LAB
B-HCG UR QL: NEGATIVE
BILIRUB BLD-MCNC: NEGATIVE MG/DL
CLARITY, POC: CLEAR
COLOR UR: YELLOW
GLUCOSE UR STRIP-MCNC: NEGATIVE MG/DL
INTERNAL NEGATIVE CONTROL: NEGATIVE
INTERNAL POSITIVE CONTROL: POSITIVE
KETONES UR QL: NEGATIVE
LEUKOCYTE EST, POC: NEGATIVE
Lab: NORMAL
NITRITE UR-MCNC: NEGATIVE MG/ML
PH UR: 7.5 [PH] (ref 5–8)
PROT UR STRIP-MCNC: NEGATIVE MG/DL
RBC # UR STRIP: ABNORMAL /UL
SP GR UR: 1.02 (ref 1–1.03)
UROBILINOGEN UR QL: NORMAL

## 2020-11-17 PROCEDURE — 81025 URINE PREGNANCY TEST: CPT | Performed by: NURSE PRACTITIONER

## 2020-11-17 PROCEDURE — 99213 OFFICE O/P EST LOW 20 MIN: CPT | Performed by: NURSE PRACTITIONER

## 2020-11-17 PROCEDURE — 76830 TRANSVAGINAL US NON-OB: CPT | Performed by: NURSE PRACTITIONER

## 2020-11-17 RX ORDER — LEVONORGESTREL AND ETHINYL ESTRADIOL 0.1-0.02MG
1 KIT ORAL DAILY
Qty: 90 TABLET | Refills: 0 | Status: SHIPPED | OUTPATIENT
Start: 2020-11-17 | End: 2021-05-18

## 2020-11-17 NOTE — PROGRESS NOTES
Subjective   Robert Beach is a 35 y.o. female.     History of Present Illness     Robert presents with complaint of intermittent mild to moderate pelvic discomfort x 1 week  Felt as if she may have a cyst  Robert reports frequent cysts in the past  She is interested in something to prevent ovarian cysts  She is using condoms for contraception  Denies new partners or concern for STI    US today shows right ovarian cyst 4 cm x 3 cm      The following portions of the patient's history were reviewed and updated as appropriate: allergies, current medications, past family history, past medical history, past social history, past surgical history and problem list.    Review of Systems   Constitutional: Negative for chills, fatigue and fever.   Gastrointestinal: Negative for abdominal distention and abdominal pain.   Genitourinary: Positive for pelvic pain. Negative for decreased urine volume, difficulty urinating, dyspareunia, dysuria, flank pain, frequency, genital sores, hematuria, menstrual problem, pelvic pressure, urgency, urinary incontinence, vaginal bleeding, vaginal discharge and vaginal pain.       Objective   Physical Exam  Constitutional:       Appearance: Normal appearance. She is not ill-appearing.   Skin:     General: Skin is warm and dry.   Neurological:      General: No focal deficit present.      Mental Status: She is alert and oriented to person, place, and time.   Psychiatric:         Mood and Affect: Mood normal.         Behavior: Behavior normal.         Assessment/Plan   Diagnoses and all orders for this visit:    1. Pelvic pain (Primary)  -     POC Pregnancy, Urine    2. Screening for blood or protein in urine  -     POC Urinalysis Dipstick, Automated    Other orders  -     levonorgestrel-ethinyl estradiol (AVIANE,ALESSE,LESSINA) 0.1-20 MG-MCG per tablet; Take 1 tablet by mouth Daily.  Dispense: 90 tablet; Refill: 0      Recommend repeat scan in 6 weeks to ensure resolution of cyst  Discussed option  for ovarian suppression with low dose OCPs- she would like to try this  Has been on OCPs in the past and done well  She is not a smoker.   Discussed risks of OCPs including blood clot, stroke, heart attack.     Will trial OCPs and return for repeat scan and follow up  Call with any worsening or new symptoms  Torsion warnings given    DANIELE Chen

## 2020-11-30 RX ORDER — ACYCLOVIR 400 MG/1
400 TABLET ORAL
Qty: 30 TABLET | Refills: 3 | Status: SHIPPED | OUTPATIENT
Start: 2020-11-30 | End: 2022-06-16

## 2020-12-14 DIAGNOSIS — N92.6 IRREGULAR MENSES: Primary | ICD-10-CM

## 2020-12-17 DIAGNOSIS — Z11.3 SCREEN FOR STD (SEXUALLY TRANSMITTED DISEASE): Primary | ICD-10-CM

## 2020-12-17 PROCEDURE — 81002 URINALYSIS NONAUTO W/O SCOPE: CPT | Performed by: PHYSICIAN ASSISTANT

## 2020-12-18 ENCOUNTER — CLINICAL SUPPORT (OUTPATIENT)
Dept: OBSTETRICS AND GYNECOLOGY | Age: 35
End: 2020-12-18

## 2020-12-18 DIAGNOSIS — Z11.3 SCREEN FOR STD (SEXUALLY TRANSMITTED DISEASE): ICD-10-CM

## 2020-12-18 DIAGNOSIS — Z13.89 SCREENING FOR BLOOD OR PROTEIN IN URINE: Primary | ICD-10-CM

## 2020-12-18 LAB
BILIRUB BLD-MCNC: NEGATIVE MG/DL
CLARITY, POC: CLEAR
COLOR UR: ABNORMAL
GLUCOSE UR STRIP-MCNC: NEGATIVE MG/DL
KETONES UR QL: NEGATIVE
LEUKOCYTE EST, POC: NEGATIVE
NITRITE UR-MCNC: NEGATIVE MG/ML
PH UR: 6.5 [PH] (ref 5–8)
PROT UR STRIP-MCNC: NEGATIVE MG/DL
RBC # UR STRIP: ABNORMAL /UL
SP GR UR: 1.02 (ref 1–1.03)
UROBILINOGEN UR QL: NORMAL

## 2020-12-19 LAB
BACTERIA UR CULT: NO GROWTH
BACTERIA UR CULT: NORMAL
FSH SERPL-ACNC: 6.6 MIU/ML
HCG INTACT+B SERPL-ACNC: 0.67 MIU/ML
PROLACTIN SERPL-MCNC: 10.3 NG/ML (ref 4.8–23.3)
TSH SERPL DL<=0.005 MIU/L-ACNC: 1.31 UIU/ML (ref 0.27–4.2)

## 2020-12-22 LAB
C TRACH RRNA SPEC QL NAA+PROBE: NEGATIVE
N GONORRHOEA RRNA SPEC QL NAA+PROBE: NEGATIVE

## 2020-12-26 ENCOUNTER — TELEPHONE (OUTPATIENT)
Dept: OBSTETRICS AND GYNECOLOGY | Age: 35
End: 2020-12-26

## 2020-12-26 NOTE — TELEPHONE ENCOUNTER
Called pt back after she paged MD on call. She notes she had some pelvic pain when she had intercourse earlier today. Pain has resolved. She had some light bleeding as well. She denies fever/chills. She denies pregnancy and reports she had a negative test 12/18. Recommend she take another pregnancy test and proceed to ER if it is positive. If test is negative; advised pt she can observe and call office when it next opens for f/u. Advised her to proceed to ER if pain recurs and she agrees with this plan.

## 2021-02-18 ENCOUNTER — OFFICE VISIT (OUTPATIENT)
Dept: OBSTETRICS AND GYNECOLOGY | Age: 36
End: 2021-02-18

## 2021-02-18 VITALS
HEIGHT: 68 IN | DIASTOLIC BLOOD PRESSURE: 70 MMHG | BODY MASS INDEX: 30.62 KG/M2 | SYSTOLIC BLOOD PRESSURE: 122 MMHG | WEIGHT: 202 LBS

## 2021-02-18 DIAGNOSIS — N89.8 VAGINAL DISCHARGE: ICD-10-CM

## 2021-02-18 DIAGNOSIS — Z11.3 SCREEN FOR STD (SEXUALLY TRANSMITTED DISEASE): ICD-10-CM

## 2021-02-18 DIAGNOSIS — Z13.89 SCREENING FOR BLOOD OR PROTEIN IN URINE: Primary | ICD-10-CM

## 2021-02-18 LAB
BILIRUB BLD-MCNC: NEGATIVE MG/DL
CLARITY, POC: CLEAR
COLOR UR: YELLOW
GLUCOSE UR STRIP-MCNC: NEGATIVE MG/DL
KETONES UR QL: NEGATIVE
LEUKOCYTE EST, POC: NEGATIVE
NITRITE UR-MCNC: NEGATIVE MG/ML
PH UR: 7 [PH] (ref 5–8)
PROT UR STRIP-MCNC: ABNORMAL MG/DL
RBC # UR STRIP: ABNORMAL /UL
SP GR UR: 1.03 (ref 1–1.03)
UROBILINOGEN UR QL: NORMAL

## 2021-02-18 PROCEDURE — 99213 OFFICE O/P EST LOW 20 MIN: CPT | Performed by: PHYSICIAN ASSISTANT

## 2021-02-18 RX ORDER — PODOFILOX 5 MG/G
GEL TOPICAL
Qty: 3.5 G | Refills: 2 | OUTPATIENT
Start: 2021-02-18 | End: 2021-09-22

## 2021-02-18 NOTE — PROGRESS NOTES
"Subjective     Chief Complaint   Patient presents with   • Gynecologic Exam     c/o spot on labia       Robert Beach is a 35 y.o.  whose LMP is Patient's last menstrual period was 02/10/2021 (exact date). presents with vaginal lesion and uti sx's    She is noting an increase in size of a previously seen vaginal lesion  I have seen her for this in the past  A biopsy was sent at that time that confirmed a benign nevus    She is also noting uti sx's as well  Last time she had something like this she had an std  She would like to have us test for std's as well    She is getting ready to donate a kidney to her Mom  Unsure when it will happen exactly    Pt of Dr Self      No Additional Complaints Reported    The following portions of the patient's history were reviewed and updated as appropriate:vital signs, allergies, current medications, past family history, past medical history, past social history, past surgical history and problem list      Review of Systems   Genitourinary:positive for uti sx's and vaginal lesion    Objective      /70   Ht 172.7 cm (68\")   Wt 91.6 kg (202 lb)   LMP 02/10/2021 (Exact Date)   Breastfeeding No   BMI 30.71 kg/m²     Physical Exam  Genitourinary:         Comments: Flesh colored lesion that is slightly raised, rough feeling. No ulcerations or lesions noted.         General:   alert, comfortable and no distress   Heart: Not performed today   Lungs: Not performed today.   Breast: Not performed today   Neck: na   Abdomen: {Not performed today   CVA: Not performed today   Pelvis: External genitalia: normal general appearance  Vaginal: normal mucosa without prolapse or lesions  Cervix: normal appearance and GC prep obtained  See pic   Extremities: Not performed today   Neurologic: negative   Psychiatric: Normal affect, judgement, and mood       Lab Review   Labs: Urinalysis - with micro     Imaging   No data reviewed    Assessment/Plan     ASSESSMENT  1. Screening for blood " or protein in urine    2. Vaginal discharge    3. Screen for STD (sexually transmitted disease)        PLAN  1.   Orders Placed This Encounter   Procedures   • Urine Culture - , Urine, Random Void   • NuSwab VG+ - Swab, Vagina   • POC Urinalysis Dipstick, Multipro       2. Medications prescribed this encounter:        New Medications Ordered This Visit   Medications   • podofilox (Condylox) 0.5 % gel     Sig: Apply  topically to the appropriate area as directed every night at bedtime.     Dispense:  3.5 g     Refill:  2       3. Previous bx/partial excision of this area did not completely resolve the lesion. It is likely that another attempts at removal would result in a similar finding. I don't recommend excision d/t location. Will try a topical cream to see if that has any benefit to reducing size of lesion.     4. Urine culture and std testing ordered.     Follow up: 3 month(s) for annual exam    PERICO Cardona  2/18/2021

## 2021-02-20 LAB
A VAGINAE DNA VAG QL NAA+PROBE: NORMAL SCORE
BACTERIA UR CULT: NORMAL
BACTERIA UR CULT: NORMAL
BVAB2 DNA VAG QL NAA+PROBE: NORMAL SCORE
C ALBICANS DNA VAG QL NAA+PROBE: NEGATIVE
C GLABRATA DNA VAG QL NAA+PROBE: NEGATIVE
C TRACH DNA VAG QL NAA+PROBE: NEGATIVE
MEGA1 DNA VAG QL NAA+PROBE: NORMAL SCORE
N GONORRHOEA DNA VAG QL NAA+PROBE: NEGATIVE
T VAGINALIS DNA VAG QL NAA+PROBE: NEGATIVE

## 2021-03-23 DIAGNOSIS — N92.6 IRREGULAR MENSES: Primary | ICD-10-CM

## 2021-03-23 DIAGNOSIS — R68.89 UNINTENTIONAL WEIGHT CHANGE: ICD-10-CM

## 2021-05-18 ENCOUNTER — OFFICE VISIT (OUTPATIENT)
Dept: OBSTETRICS AND GYNECOLOGY | Age: 36
End: 2021-05-18

## 2021-05-18 VITALS
WEIGHT: 202 LBS | DIASTOLIC BLOOD PRESSURE: 80 MMHG | HEIGHT: 68 IN | SYSTOLIC BLOOD PRESSURE: 122 MMHG | BODY MASS INDEX: 30.62 KG/M2

## 2021-05-18 DIAGNOSIS — Z11.51 SCREENING FOR HPV (HUMAN PAPILLOMAVIRUS): ICD-10-CM

## 2021-05-18 DIAGNOSIS — N92.6 IRREGULAR MENSES: ICD-10-CM

## 2021-05-18 DIAGNOSIS — Z30.09 CONTRACEPTIVE EDUCATION: ICD-10-CM

## 2021-05-18 DIAGNOSIS — Z01.419 WELL WOMAN EXAM WITH ROUTINE GYNECOLOGICAL EXAM: Primary | ICD-10-CM

## 2021-05-18 LAB
B-HCG UR QL: NEGATIVE
INTERNAL NEGATIVE CONTROL: NORMAL
INTERNAL POSITIVE CONTROL: NORMAL
Lab: NORMAL

## 2021-05-18 PROCEDURE — 81025 URINE PREGNANCY TEST: CPT | Performed by: PHYSICIAN ASSISTANT

## 2021-05-18 PROCEDURE — 99395 PREV VISIT EST AGE 18-39: CPT | Performed by: PHYSICIAN ASSISTANT

## 2021-05-18 PROCEDURE — 3008F BODY MASS INDEX DOCD: CPT | Performed by: PHYSICIAN ASSISTANT

## 2021-05-18 PROCEDURE — 2014F MENTAL STATUS ASSESS: CPT | Performed by: PHYSICIAN ASSISTANT

## 2021-05-18 RX ORDER — LACTIC ACID, L-, CITRIC ACID MONOHYDRATE, AND POTASSIUM BITARTRATE 90; 50; 20 MG/5G; MG/5G; MG/5G
1 GEL VAGINAL AS NEEDED
Qty: 5 G | Refills: 1 | OUTPATIENT
Start: 2021-05-18 | End: 2021-09-22

## 2021-05-18 NOTE — PROGRESS NOTES
Subjective     Chief Complaint   Patient presents with   • Gynecologic Exam     annual exam last pap 02/10/2020 neg/no hpv       History of Present Illness    Robert Beach is a 35 y.o.  who presents for annual exam.    Here for annual exam  Doing ok  Stopped BCP  Doesn't like hormonal contraception  Also tried paragard but didn't like that either  Wants a TL and planned to get it with Dr Barth but pandemic hit and he retired  Will d/w Dr Mckeon  Uses condoms currently  Also discussed phexxi  She would be interested in trying that    No new med issues  Saw PCP routinely    Is in a committed relationship  They share an 7 yo daughter together    Works in medical office    Her menses are regular every 28-30 days, lasting 4-7 days, dysmenorrhea none   Obstetric History:  OB History        8    Para   3    Term   2       1    AB   3    Living   4       SAB   1    TAB   1    Ectopic        Molar        Multiple   1    Live Births   4               Menstrual History:     Patient's last menstrual period was 2021.         Current contraception: condoms  History of abnormal Pap smear: yes -   Received Gardasil immunization: no  Perform regular self breast exam: yes - occl  Family history of uterine or ovarian cancer: no  Family History of colon cancer: no  Family history of breast cancer: no    Mammogram: not indicated.  Colonoscopy: not indicated.  DEXA: not indicated.    Exercise: moderately active  Calcium/Vitamin D: adequate intake    The following portions of the patient's history were reviewed and updated as appropriate: allergies, current medications, past family history, past medical history, past social history, past surgical history and problem list.    Review of Systems   All other systems reviewed and are negative.      Review of Systems   Constitutional: Negative for fatigue.   Respiratory: Negative for shortness of breath.    Gastrointestinal: Negative for abdominal pain.  "  Genitourinary: Negative for dysuria.   Neurological: Negative for headaches.   Psychiatric/Behavioral: Negative for dysphoric mood.         Objective   Physical Exam    /80   Ht 172.7 cm (68\")   Wt 91.6 kg (202 lb)   LMP 05/18/2021   Breastfeeding No   BMI 30.71 kg/m²   General:   alert, comfortable and no distress   Heart: regular rate and rhythm   Lungs: clear to auscultation bilaterally   Breast: normal appearance, no masses or tenderness, Inspection negative, No nipple retraction or dimpling, No nipple discharge or bleeding, No axillary or supraclavicular adenopathy, Normal to palpation without dominant masses   Neck: no adenopathy and no carotid bruit   Abdomen: {normal findings: soft, non-tender   CVA: Not performed today   Pelvis: External genitalia: normal general appearance  Vaginal: normal mucosa without prolapse or lesions  Cervix: normal appearance and thin prep PAP obtained  Adnexa: normal bimanual exam  Uterus: normal single, nontender   Extremities: Not performed today   Neurologic: negative   Psychiatric: Normal affect, judgement, and mood     Assessment/Plan   Diagnoses and all orders for this visit:    1. Well woman exam with routine gynecological exam (Primary)  -     IGP, Aptima HPV, Rfx 16 / 18,45    2. Irregular menses  -     POC Pregnancy, Urine    3. Screening for HPV (human papillomavirus)  -     IGP, Aptima HPV, Rfx 16 / 18,45    4. Contraceptive education    Other orders  -     Lactic Ac-Citric Ac-Pot Bitart (Phexxi) 1.8-1-0.4 % gel; Insert 1 application into the vagina As Needed (when SA).  Dispense: 5 g; Refill: 1        All questions answered.  Breast self exam technique reviewed and patient encouraged to perform self-exam monthly.  Discussed healthy lifestyle modifications.  Recommended 30 minutes of aerobic exercise five times per week.  Discussed calcium needs to prevent osteoporosis.    Pap done today  Can try phexxi, coupon given  Schedule appt with Dr Mckeon to discuss " "TL  Plan thyroid labs today, pt requested them a few months ago d/t \"lower tsh levels\" noted over the past few years               "

## 2021-05-19 LAB
FT4I SERPL CALC-MCNC: 1.5 (ref 1.2–4.9)
T3RU NFR SERPL: 25 % (ref 24–39)
T4 SERPL-MCNC: 5.8 UG/DL (ref 4.5–12)
TSH SERPL DL<=0.005 MIU/L-ACNC: 1.53 UIU/ML (ref 0.27–4.2)

## 2021-05-21 LAB
CYTOLOGIST CVX/VAG CYTO: NORMAL
CYTOLOGY CVX/VAG DOC CYTO: NORMAL
CYTOLOGY CVX/VAG DOC THIN PREP: NORMAL
DX ICD CODE: NORMAL
HIV 1 & 2 AB SER-IMP: NORMAL
HPV I/H RISK 4 DNA CVX QL PROBE+SIG AMP: NEGATIVE
Lab: NORMAL
OTHER STN SPEC: NORMAL
STAT OF ADQ CVX/VAG CYTO-IMP: NORMAL

## 2021-09-17 NOTE — ED PROVIDER NOTES
Pt present to the ED complaining of vaginal bleeding and pain that feels like contractions.   Last menstrual period was second week of September.  Awaiting OBGYN consult.    On exam, pt has mild suprapubic tenderness without guarding or rebound.    After discussion with her OB, she had a documented IUP on initial U/S.  Her U/S tonight is most consistent with fetal demise and impending Ab, but she will need close follow up to make sure we are not dealing with an ectopic.  She understands need to return PRN any worsening of her symptoms.        I supervised care provided by the midlevel provider.    We have discussed this patient's history, physical exam, and treatment plan.   I have reviewed the note and personally saw and examined the patient and agree with the plan of care.    Documentation assistance provided by rogers Beckett for Dr. Modi.  Information recorded by the scribe was done at my direction and has been verified and validated by me.           Evelyne Beckett  11/20/17 1930       Steven Modi MD  11/21/17 0041       Steven Modi MD  11/21/17 0053     59

## 2021-11-15 ENCOUNTER — OFFICE VISIT (OUTPATIENT)
Dept: CARDIOLOGY | Facility: CLINIC | Age: 36
End: 2021-11-15

## 2021-11-15 VITALS
HEIGHT: 68 IN | HEART RATE: 68 BPM | DIASTOLIC BLOOD PRESSURE: 70 MMHG | OXYGEN SATURATION: 98 % | WEIGHT: 202.8 LBS | SYSTOLIC BLOOD PRESSURE: 116 MMHG | BODY MASS INDEX: 30.74 KG/M2

## 2021-11-15 DIAGNOSIS — R42 POSTURAL DIZZINESS WITH PRESYNCOPE: ICD-10-CM

## 2021-11-15 DIAGNOSIS — R55 POSTURAL DIZZINESS WITH PRESYNCOPE: ICD-10-CM

## 2021-11-15 DIAGNOSIS — R00.2 PALPITATIONS: Primary | ICD-10-CM

## 2021-11-15 PROCEDURE — 93000 ELECTROCARDIOGRAM COMPLETE: CPT | Performed by: INTERNAL MEDICINE

## 2021-11-15 PROCEDURE — 99204 OFFICE O/P NEW MOD 45 MIN: CPT | Performed by: INTERNAL MEDICINE

## 2021-11-15 RX ORDER — CARIPRAZINE 1.5 MG/1
CAPSULE, GELATIN COATED ORAL
COMMUNITY
Start: 2021-11-11 | End: 2022-04-20

## 2021-11-15 NOTE — PROGRESS NOTES
"      CARDIOLOGY    Danay Adhikari MD    ENCOUNTER DATE:  11/15/2021    Robert Beach / 36 y.o. / female        CHIEF COMPLAINT / REASON FOR OFFICE VISIT     Palpitations, Chest Pain, and Rapid Heart Rate      HISTORY OF PRESENT ILLNESS       HPI    Robert Beach is a 36 y.o. female     This is a lady who was seen many years ago. She had an echo out in [place] that showed normal LV function and no significant valve disease. She had COVID in June of 2021 and ever since COVID she has been having a lot of palpitations like her heart is racing or skipping. She has had some presyncope, but no syncope. She has had some chest pain and shortness of breath.         REVIEW OF SYSTEMS     Review of Systems   Constitutional: Negative for fever, malaise/fatigue, weight gain and weight loss.   HENT: Negative for ear pain, hearing loss, nosebleeds and sore throat.    Eyes: Negative for double vision, pain, vision loss in left eye and vision loss in right eye.   Cardiovascular:        See history of present illness.   Respiratory: Negative for cough, shortness of breath, sleep disturbances due to breathing, snoring and wheezing.    Endocrine: Negative for cold intolerance, heat intolerance and polyuria.   Skin: Negative for itching, poor wound healing and rash.   Musculoskeletal: Negative for joint pain, joint swelling and myalgias.   Gastrointestinal: Negative for abdominal pain, diarrhea, hematochezia, nausea and vomiting.   Genitourinary: Negative for hematuria and hesitancy.   Neurological: Negative for numbness, paresthesias and seizures.   Psychiatric/Behavioral: Negative for depression. The patient is not nervous/anxious.          VITAL SIGNS     Visit Vitals  /70 (BP Location: Left arm)   Pulse 68   Ht 172.7 cm (68\")   Wt 92 kg (202 lb 12.8 oz)   SpO2 98%   BMI 30.84 kg/m²         Wt Readings from Last 3 Encounters:   11/15/21 92 kg (202 lb 12.8 oz)   09/22/21 86.2 kg (190 lb)   05/18/21 91.6 kg (202 lb)     Body " HPI:    Patient ID: Deana Gayle is a 39year old female. Presents for physical exam    Still undergoing fertility txs. Has continued right shoulder discomfort now with exrension to left shoulder. Denies any previous trauma.   But notes a feeling Negative for chest pain, palpitations and leg swelling. Gastrointestinal: Negative for abdominal pain, blood in stool, constipation, diarrhea, nausea and vomiting. Endocrine: Negative for cold intolerance and heat intolerance.    Genitourinary: Negative mass index is 30.84 kg/m².      PHYSICAL EXAMINATION     Constitutional:       General: Not in acute distress.  Neck:      Vascular: No carotid bruit or JVD.   Pulmonary:      Effort: Pulmonary effort is normal.      Breath sounds: Normal breath sounds.   Cardiovascular:      Normal rate. Regular rhythm.      Murmurs: There is no murmur.   Psychiatric:         Mood and Affect: Mood and affect normal.           REVIEWED DATA       ECG 12 Lead    Date/Time: 11/15/2021 1:36 PM  Performed by: Danay Adhikari MD  Authorized by: Danay Adhikari MD   Comparison: not compared with previous ECG   Previous ECG: no previous ECG available  Rhythm: sinus rhythm  BPM: 68  Conduction: conduction normal  ST Segments: ST segments normal  T Waves: T waves normal    Clinical impression: normal ECG                  Lab Results   Component Value Date    GLUCOSE 100 (H) 11/20/2017    BUN 9 03/19/2021    CREATININE 0.6 (L) 03/19/2021    EGFRIFNONA 99 11/20/2017    EGFRIFAFRI 111 01/25/2017    BCR 15.0 03/19/2021    K 4.6 03/19/2021    CO2 25 03/19/2021    CALCIUM 9.7 03/19/2021    PROTENTOTREF 7.4 01/25/2017    ALBUMIN 4.7 03/19/2021    LABIL2 1.7 03/19/2021    AST 17 03/19/2021    ALT 14 03/19/2021       ASSESSMENT & PLAN      Diagnosis Plan   1. Palpitations  Adult Transthoracic Echo Complete W/ Cont if Necessary Per Protocol    Holter Monitor - 72 Hour Up To 15 Days   2. Postural dizziness with presyncope  Adult Transthoracic Echo Complete W/ Cont if Necessary Per Protocol    Holter Monitor - 72 Hour Up To 15 Days       Since she has had COVID she has had palpitations, presyncope, syncope, chest pain, and shortness of breath. She was exercising routinely prior to COVID. She has not felt comfortable getting back into exercise yet. I am going to check an echocardiogram and also a Zio patch to see if she is having any significant cardiomyopathy or arrhythmia. Myself or one of my nurses will be in touch with results as they become  available.       Orders Placed This Encounter   Procedures   • Holter Monitor - 72 Hour Up To 15 Days     Standing Status:   Future     Standing Expiration Date:   11/15/2022     Scheduling Instructions:      ZIO     Order Specific Question:   Reason for exam?     Answer:   Palpitations   • ECG 12 Lead     This order was created via procedure documentation     Order Specific Question:   Release to patient     Answer:   Immediate   • Adult Transthoracic Echo Complete W/ Cont if Necessary Per Protocol     Standing Status:   Future     Standing Expiration Date:   11/15/2022     Order Specific Question:   Reason for exam?     Answer:   Palpitations           MEDICATIONS         Discharge Medications          Accurate as of November 15, 2021  1:37 PM. If you have any questions, ask your nurse or doctor.            Continue These Medications      Instructions Start Date   acyclovir 400 MG tablet  Commonly known as: ZOVIRAX   400 mg, Oral, Every 4 Hours While Awake, Take no more than 5 doses a day.      clonazePAM 1 MG tablet  Commonly known as: KlonoPIN   1 mg, Oral, Daily PRN      Vraylar 1.5 MG capsule capsule  Generic drug: Cariprazine HCl   No dose, route, or frequency recorded.               Danay Adhikari MD  11/15/21  13:37 EST    **Shannon Disclaimer:   Much of this encounter note is an electronic transcription/translation of spoken language to printed text. The electronic translation of spoken language may permit erroneous, or at times, nonsensical words or phrases to be inadvertently transcribed. Although I have reviewed the note for such errors, some may still exist.    range of motion both shoulders bilaterally without any distinct pain noted. Lymphadenopathy:     She has no cervical adenopathy. Neurological: She is alert and oriented to person, place, and time. She has normal reflexes. No cranial nerve deficit.  She

## 2021-11-29 ENCOUNTER — HOSPITAL ENCOUNTER (OUTPATIENT)
Dept: CARDIOLOGY | Facility: HOSPITAL | Age: 36
Discharge: HOME | End: 2021-11-29
Admitting: INTERNAL MEDICINE

## 2021-11-29 VITALS
BODY MASS INDEX: 30.62 KG/M2 | HEIGHT: 68 IN | SYSTOLIC BLOOD PRESSURE: 150 MMHG | HEART RATE: 95 BPM | WEIGHT: 202 LBS | DIASTOLIC BLOOD PRESSURE: 100 MMHG

## 2021-11-29 DIAGNOSIS — R00.2 PALPITATIONS: ICD-10-CM

## 2021-11-29 DIAGNOSIS — R42 POSTURAL DIZZINESS WITH PRESYNCOPE: ICD-10-CM

## 2021-11-29 DIAGNOSIS — R55 POSTURAL DIZZINESS WITH PRESYNCOPE: ICD-10-CM

## 2021-11-29 LAB
AORTIC ARCH: 3 CM
ASCENDING AORTA: 3.5 CM
BH CV ECHO MEAS - ACS: 2.6 CM
BH CV ECHO MEAS - AO MAX PG (FULL): 3.2 MMHG
BH CV ECHO MEAS - AO MAX PG: 6 MMHG
BH CV ECHO MEAS - AO MEAN PG (FULL): 1.2 MMHG
BH CV ECHO MEAS - AO MEAN PG: 2.7 MMHG
BH CV ECHO MEAS - AO ROOT AREA (BSA CORRECTED): 1.8
BH CV ECHO MEAS - AO ROOT AREA: 11 CM^2
BH CV ECHO MEAS - AO ROOT DIAM: 3.7 CM
BH CV ECHO MEAS - AO V2 MAX: 122.2 CM/SEC
BH CV ECHO MEAS - AO V2 MEAN: 75.4 CM/SEC
BH CV ECHO MEAS - AO V2 VTI: 20.7 CM
BH CV ECHO MEAS - ASC AORTA: 3.5 CM
BH CV ECHO MEAS - AVA(I,A): 3.4 CM^2
BH CV ECHO MEAS - AVA(I,D): 3.4 CM^2
BH CV ECHO MEAS - AVA(V,A): 2.6 CM^2
BH CV ECHO MEAS - AVA(V,D): 2.6 CM^2
BH CV ECHO MEAS - BSA(HAYCOCK): 2.1 M^2
BH CV ECHO MEAS - BSA: 2.1 M^2
BH CV ECHO MEAS - BZI_BMI: 30.7 KILOGRAMS/M^2
BH CV ECHO MEAS - BZI_METRIC_HEIGHT: 172.7 CM
BH CV ECHO MEAS - BZI_METRIC_WEIGHT: 91.6 KG
BH CV ECHO MEAS - EDV(MOD-SP2): 133 ML
BH CV ECHO MEAS - EDV(MOD-SP4): 110 ML
BH CV ECHO MEAS - EDV(TEICH): 69.3 ML
BH CV ECHO MEAS - EF(CUBED): 64.8 %
BH CV ECHO MEAS - EF(MOD-BP): 60.7 %
BH CV ECHO MEAS - EF(MOD-SP2): 60.9 %
BH CV ECHO MEAS - EF(MOD-SP4): 58.2 %
BH CV ECHO MEAS - EF(TEICH): 56.9 %
BH CV ECHO MEAS - ESV(MOD-SP2): 52 ML
BH CV ECHO MEAS - ESV(MOD-SP4): 46 ML
BH CV ECHO MEAS - ESV(TEICH): 29.9 ML
BH CV ECHO MEAS - FS: 29.4 %
BH CV ECHO MEAS - IVS/LVPW: 0.99
BH CV ECHO MEAS - IVSD: 0.92 CM
BH CV ECHO MEAS - LAT PEAK E' VEL: 12.5 CM/SEC
BH CV ECHO MEAS - LV DIASTOLIC VOL/BSA (35-75): 53.6 ML/M^2
BH CV ECHO MEAS - LV MASS(C)D: 113.1 GRAMS
BH CV ECHO MEAS - LV MASS(C)DI: 55.1 GRAMS/M^2
BH CV ECHO MEAS - LV MAX PG: 2.8 MMHG
BH CV ECHO MEAS - LV MEAN PG: 1.5 MMHG
BH CV ECHO MEAS - LV SYSTOLIC VOL/BSA (12-30): 22.4 ML/M^2
BH CV ECHO MEAS - LV V1 MAX: 82.9 CM/SEC
BH CV ECHO MEAS - LV V1 MEAN: 58.8 CM/SEC
BH CV ECHO MEAS - LV V1 VTI: 18 CM
BH CV ECHO MEAS - LVIDD: 4 CM
BH CV ECHO MEAS - LVIDS: 2.8 CM
BH CV ECHO MEAS - LVLD AP2: 8.9 CM
BH CV ECHO MEAS - LVLD AP4: 7.6 CM
BH CV ECHO MEAS - LVLS AP2: 7.4 CM
BH CV ECHO MEAS - LVLS AP4: 6.5 CM
BH CV ECHO MEAS - LVOT AREA (M): 3.8 CM^2
BH CV ECHO MEAS - LVOT AREA: 3.9 CM^2
BH CV ECHO MEAS - LVOT DIAM: 2.2 CM
BH CV ECHO MEAS - LVPWD: 0.93 CM
BH CV ECHO MEAS - MED PEAK E' VEL: 7.3 CM/SEC
BH CV ECHO MEAS - MR MAX PG: 21.6 MMHG
BH CV ECHO MEAS - MR MAX VEL: 232.1 CM/SEC
BH CV ECHO MEAS - MV A DUR: 0.1 SEC
BH CV ECHO MEAS - MV A MAX VEL: 108.4 CM/SEC
BH CV ECHO MEAS - MV DEC SLOPE: 734.3 CM/SEC^2
BH CV ECHO MEAS - MV DEC TIME: 0.18 SEC
BH CV ECHO MEAS - MV E MAX VEL: 78 CM/SEC
BH CV ECHO MEAS - MV E/A: 0.72
BH CV ECHO MEAS - MV MAX PG: 4.7 MMHG
BH CV ECHO MEAS - MV MEAN PG: 2.7 MMHG
BH CV ECHO MEAS - MV P1/2T MAX VEL: 108.6 CM/SEC
BH CV ECHO MEAS - MV P1/2T: 43.3 MSEC
BH CV ECHO MEAS - MV V2 MAX: 108.6 CM/SEC
BH CV ECHO MEAS - MV V2 MEAN: 78.2 CM/SEC
BH CV ECHO MEAS - MV V2 VTI: 26.4 CM
BH CV ECHO MEAS - MVA P1/2T LCG: 2 CM^2
BH CV ECHO MEAS - MVA(P1/2T): 5.1 CM^2
BH CV ECHO MEAS - MVA(VTI): 2.7 CM^2
BH CV ECHO MEAS - PA ACC TIME: 0.11 SEC
BH CV ECHO MEAS - PA MAX PG (FULL): 0.75 MMHG
BH CV ECHO MEAS - PA MAX PG: 3.9 MMHG
BH CV ECHO MEAS - PA PR(ACCEL): 29.1 MMHG
BH CV ECHO MEAS - PA V2 MAX: 99.1 CM/SEC
BH CV ECHO MEAS - PULM A REVS DUR: 0.14 SEC
BH CV ECHO MEAS - PULM A REVS VEL: 46.3 CM/SEC
BH CV ECHO MEAS - PULM DIAS VEL: 49.4 CM/SEC
BH CV ECHO MEAS - PULM S/D: 1.5
BH CV ECHO MEAS - PULM SYS VEL: 75.1 CM/SEC
BH CV ECHO MEAS - PVA(V,A): 2.3 CM^2
BH CV ECHO MEAS - PVA(V,D): 2.3 CM^2
BH CV ECHO MEAS - QP/QS: 0.59
BH CV ECHO MEAS - RAP SYSTOLE: 3 MMHG
BH CV ECHO MEAS - RV MAX PG: 3.2 MMHG
BH CV ECHO MEAS - RV MEAN PG: 2 MMHG
BH CV ECHO MEAS - RV V1 MAX: 89.1 CM/SEC
BH CV ECHO MEAS - RV V1 MEAN: 67.7 CM/SEC
BH CV ECHO MEAS - RV V1 VTI: 16.6 CM
BH CV ECHO MEAS - RVOT AREA: 2.5 CM^2
BH CV ECHO MEAS - RVOT DIAM: 1.8 CM
BH CV ECHO MEAS - RVSP: 30.2 MMHG
BH CV ECHO MEAS - SI(AO): 111 ML/M^2
BH CV ECHO MEAS - SI(CUBED): 19.9 ML/M^2
BH CV ECHO MEAS - SI(LVOT): 34.2 ML/M^2
BH CV ECHO MEAS - SI(MOD-SP2): 39.5 ML/M^2
BH CV ECHO MEAS - SI(MOD-SP4): 31.2 ML/M^2
BH CV ECHO MEAS - SI(TEICH): 19.2 ML/M^2
BH CV ECHO MEAS - SUP REN AO DIAM: 1.4 CM
BH CV ECHO MEAS - SV(AO): 227.9 ML
BH CV ECHO MEAS - SV(CUBED): 40.9 ML
BH CV ECHO MEAS - SV(LVOT): 70.3 ML
BH CV ECHO MEAS - SV(MOD-SP2): 81 ML
BH CV ECHO MEAS - SV(MOD-SP4): 64 ML
BH CV ECHO MEAS - SV(RVOT): 41.7 ML
BH CV ECHO MEAS - SV(TEICH): 39.4 ML
BH CV ECHO MEAS - TAPSE (>1.6): 2.3 CM
BH CV ECHO MEAS - TR MAX VEL: 260.6 CM/SEC
BH CV ECHO MEASUREMENTS AVERAGE E/E' RATIO: 7.88
BH CV XLRA - RV BASE: 2.6 CM
BH CV XLRA - RV LENGTH: 6.5 CM
BH CV XLRA - RV MID: 2.5 CM
BH CV XLRA - TDI S': 14.7 CM/SEC
LEFT ATRIUM VOLUME INDEX: 15 ML/M2
MAXIMAL PREDICTED HEART RATE: 184 BPM
SINUS: 3.3 CM
STJ: 3.1 CM
STRESS TARGET HR: 156 BPM

## 2021-11-29 PROCEDURE — 93306 TTE W/DOPPLER COMPLETE: CPT | Performed by: INTERNAL MEDICINE

## 2021-11-29 PROCEDURE — 93306 TTE W/DOPPLER COMPLETE: CPT

## 2021-11-30 ENCOUNTER — TELEPHONE (OUTPATIENT)
Dept: CARDIOLOGY | Facility: CLINIC | Age: 36
End: 2021-11-30

## 2021-12-21 ENCOUNTER — TELEPHONE (OUTPATIENT)
Dept: CARDIOLOGY | Facility: CLINIC | Age: 36
End: 2021-12-21

## 2021-12-21 NOTE — TELEPHONE ENCOUNTER
Please let her know that her monitor was normal.  Most the time she hit the button there was no arrhythmia but a few time she had a premature atrial or premature ventricular contraction.  These are benign.  Avoid caffeine or any stimulants.  Regular exercise.  Reassurance that she does not need any further work-up.  She had a normal echocardiogram on November 29.  No further testing.  Follow-up with PCP.

## 2022-04-20 ENCOUNTER — OFFICE VISIT (OUTPATIENT)
Dept: OBSTETRICS AND GYNECOLOGY | Age: 37
End: 2022-04-20

## 2022-04-20 VITALS
DIASTOLIC BLOOD PRESSURE: 72 MMHG | HEIGHT: 67 IN | SYSTOLIC BLOOD PRESSURE: 114 MMHG | WEIGHT: 198 LBS | BODY MASS INDEX: 31.08 KG/M2

## 2022-04-20 DIAGNOSIS — N94.9 VAGINAL BURNING: ICD-10-CM

## 2022-04-20 DIAGNOSIS — N89.8 VAGINAL DISCHARGE: ICD-10-CM

## 2022-04-20 DIAGNOSIS — Z13.89 SCREENING FOR BLOOD OR PROTEIN IN URINE: Primary | ICD-10-CM

## 2022-04-20 DIAGNOSIS — R31.29 OTHER MICROSCOPIC HEMATURIA: ICD-10-CM

## 2022-04-20 LAB
BILIRUB BLD-MCNC: NEGATIVE MG/DL
CLARITY, POC: CLEAR
COLOR UR: YELLOW
GLUCOSE UR STRIP-MCNC: NEGATIVE MG/DL
KETONES UR QL: NEGATIVE
LEUKOCYTE EST, POC: ABNORMAL
NITRITE UR-MCNC: NEGATIVE MG/ML
PH UR: 6 [PH] (ref 5–8)
PROT UR STRIP-MCNC: NEGATIVE MG/DL
RBC # UR STRIP: ABNORMAL /UL
SP GR UR: 1.03 (ref 1–1.03)
UROBILINOGEN UR QL: NORMAL

## 2022-04-20 PROCEDURE — 99213 OFFICE O/P EST LOW 20 MIN: CPT | Performed by: PHYSICIAN ASSISTANT

## 2022-04-20 RX ORDER — CLOTRIMAZOLE AND BETAMETHASONE DIPROPIONATE 10; .64 MG/G; MG/G
1 CREAM TOPICAL 2 TIMES DAILY
Qty: 15 G | Refills: 0 | Status: SHIPPED | OUTPATIENT
Start: 2022-04-20 | End: 2022-06-08

## 2022-04-20 NOTE — PROGRESS NOTES
"Subjective     Chief Complaint   Patient presents with   • Gynecologic Exam     C/o vaginal discharge and itching and burning, taken 2 diflucan and no help.       Robert Beach is a 36 y.o.  whose LMP is Patient's last menstrual period was 2022 (approximate). presents with vaginal burning    Onset of sxs after taking macrobid  Mostly vaginal burning  Little d/c  Was tested for gonorrhea and chlamydia at  which was negative but would like retesting done today    Using condoms for BC  Had considered TL but never did meet with Dr Mckeon  Unsure if she wants to     Annual due next month      No Additional Complaints Reported    The following portions of the patient's history were reviewed and updated as appropriate:vital signs, allergies, current medications, past family history, past medical history, past social history, past surgical history and problem list      Review of Systems   Genitourinary:positive for vaginal discharge     Objective      /72   Ht 170.2 cm (67\")   Wt 89.8 kg (198 lb)   LMP 2022 (Approximate)   Breastfeeding No   BMI 31.01 kg/m²     Physical Exam    General:   alert, comfortable and no distress   Heart: Not performed today   Lungs: Not performed today.   Breast: Not performed today   Neck: Not performed today   Abdomen: Not performed today   CVA: Not performed today   Pelvis: External genitalia: normal general appearance  Vaginal: normal mucosa without prolapse or lesions and discharge, brown and thick  Cervix: normal appearance and GC prep obtained  Adnexa: na   Extremities: Not performed today   Neurologic: negative   Psychiatric: Normal affect, judgement, and mood       Lab Review   Labs: Urinalysis - with micro    Imaging   No data reviewed    Assessment/Plan     ASSESSMENT  1. Screening for blood or protein in urine    2. Vaginal discharge    3. Other microscopic hematuria    4. Vaginal burning          PLAN  1.   Orders Placed This Encounter   Procedures   • " Urine Culture - Urine, Urine, Random Void   • NuSwab VG+ - Swab, Vagina   • POC Urinalysis Dipstick, Multipro       2. Medications prescribed this encounter:        New Medications Ordered This Visit   Medications   • clotrimazole-betamethasone (Lotrisone) 1-0.05 % cream     Sig: Apply 1 application topically to the appropriate area as directed 2 (Two) Times a Day.     Dispense:  15 g     Refill:  0       3. Await culture. Can use topical lotrisone prn burning. Plan annual exam next month. Call for any issues in meantime    Follow up: 1 month(s)    PERICO Cardona  4/20/2022

## 2022-04-22 LAB
A VAGINAE DNA VAG QL NAA+PROBE: ABNORMAL SCORE
BACTERIA UR CULT: NO GROWTH
BACTERIA UR CULT: NORMAL
BVAB2 DNA VAG QL NAA+PROBE: ABNORMAL SCORE
C ALBICANS DNA VAG QL NAA+PROBE: POSITIVE
C GLABRATA DNA VAG QL NAA+PROBE: NEGATIVE
C TRACH DNA VAG QL NAA+PROBE: NEGATIVE
MEGA1 DNA VAG QL NAA+PROBE: ABNORMAL SCORE
N GONORRHOEA DNA VAG QL NAA+PROBE: NEGATIVE
T VAGINALIS DNA VAG QL NAA+PROBE: NEGATIVE

## 2022-04-25 ENCOUNTER — TELEPHONE (OUTPATIENT)
Dept: OBSTETRICS AND GYNECOLOGY | Age: 37
End: 2022-04-25

## 2022-04-25 RX ORDER — FLUCONAZOLE 150 MG/1
150 TABLET ORAL ONCE
Qty: 1 TABLET | Refills: 0 | Status: SHIPPED | OUTPATIENT
Start: 2022-04-25 | End: 2022-04-25

## 2022-04-25 NOTE — TELEPHONE ENCOUNTER
----- Message from PERICO Rodriguez sent at 4/25/2022 10:10 AM EDT -----  Notify pt that her std results are negative and she shows negative for BV as well. She is showing + for yeast. I'll send in diflucan for her.

## 2022-05-10 ENCOUNTER — OFFICE VISIT (OUTPATIENT)
Dept: OBSTETRICS AND GYNECOLOGY | Age: 37
End: 2022-05-10

## 2022-05-10 VITALS
DIASTOLIC BLOOD PRESSURE: 70 MMHG | SYSTOLIC BLOOD PRESSURE: 112 MMHG | BODY MASS INDEX: 31.23 KG/M2 | WEIGHT: 199 LBS | HEIGHT: 67 IN

## 2022-05-10 DIAGNOSIS — N92.6 IRREGULAR BLEEDING: ICD-10-CM

## 2022-05-10 DIAGNOSIS — N83.202 LEFT OVARIAN CYST: Primary | ICD-10-CM

## 2022-05-10 PROCEDURE — 99213 OFFICE O/P EST LOW 20 MIN: CPT | Performed by: PHYSICIAN ASSISTANT

## 2022-05-10 NOTE — PROGRESS NOTES
"Subjective     Chief Complaint   Patient presents with   • Abdominal Pain     C/o left side pain (had ultrasound). Had intercourse and started bleeding, went away and had intercourse again and same thing happened.       Robert Beach is a 36 y.o.  whose LMP is Patient's last menstrual period was 2022 (exact date). presents with     Pt noted pain when SA  Then had some bleeding after IC  This happened twice  She was seen by me last month and had std testing done that was negative  Not at risk for infection    Half sister has gene that codes for breast/ovairan cancer (she thinks)  It is on her mom's side and she is sister through her dad    No Additional Complaints Reported    The following portions of the patient's history were reviewed and updated as appropriate:vital signs, allergies, current medications, past family history, past medical history, past social history, past surgical history and problem list      Review of Systems   Genitourinary:positive for abnormal menstrual periods and pelvic pain     Objective      /70   Ht 170.2 cm (67\")   Wt 90.3 kg (199 lb)   LMP 2022 (Exact Date)   Breastfeeding No   BMI 31.17 kg/m²     Physical Exam    General:   alert, comfortable and no distress   Heart: Not performed today   Lungs: Not performed today.   Breast: Not performed today   Neck: Not performed today   Abdomen: Not performed today   CVA: Not performed today   Pelvis: Not performed today   Extremities: Not performed today   Neurologic: negative   Psychiatric: Normal affect, judgement, and mood       Lab Review   Labs: No data reviewed    Imaging   Ultrasound - Pelvic Vaginal    1.  Uterus: Enlarged and with uterine volume of 161.88 ml     2.  Endometrium: 16.93 mm with pocket of fluid seen measuring 2.05 x .69 cm mm  and n     3.  Myometrium: Normal homogenous texture      4.  Ovaries               Left: Enlarged and Complex cystic mass 4.36 x 4.11 cm septations noted               " Right: Normal/unremarkable     [unfilled]    ASSESSMENT  1. Left ovarian cyst    2. Irregular bleeding          PLAN  1.   Orders Placed This Encounter   Procedures   • HCG, B-subunit, Quantitative   •    • CBC & Differential       2. Labs ordered to assess ovarian cyst. Will plan recheck in 4 wks. May need endometrial biopsy as well. Nexstellis sample given to start today. Disc ovarian torsion warnings and advised pelvic rest in meantime. Call for worsening sx's    Follow up: 4 week(s)    PERICO Cardona  5/10/2022

## 2022-05-11 LAB
BASOPHILS # BLD AUTO: 0.1 X10E3/UL (ref 0–0.2)
BASOPHILS NFR BLD AUTO: 1 %
CANCER AG125 SERPL-ACNC: 8.6 U/ML (ref 0–38.1)
EOSINOPHIL # BLD AUTO: 0.1 X10E3/UL (ref 0–0.4)
EOSINOPHIL NFR BLD AUTO: 2 %
ERYTHROCYTE [DISTWIDTH] IN BLOOD BY AUTOMATED COUNT: 11.8 % (ref 11.7–15.4)
HCG INTACT+B SERPL-ACNC: <1 MIU/ML
HCT VFR BLD AUTO: 41.3 % (ref 34–46.6)
HGB BLD-MCNC: 13.7 G/DL (ref 11.1–15.9)
IMM GRANULOCYTES # BLD AUTO: 0.1 X10E3/UL (ref 0–0.1)
IMM GRANULOCYTES NFR BLD AUTO: 1 %
LYMPHOCYTES # BLD AUTO: 2.2 X10E3/UL (ref 0.7–3.1)
LYMPHOCYTES NFR BLD AUTO: 27 %
MCH RBC QN AUTO: 29 PG (ref 26.6–33)
MCHC RBC AUTO-ENTMCNC: 33.2 G/DL (ref 31.5–35.7)
MCV RBC AUTO: 87 FL (ref 79–97)
MONOCYTES # BLD AUTO: 0.7 X10E3/UL (ref 0.1–0.9)
MONOCYTES NFR BLD AUTO: 8 %
NEUTROPHILS # BLD AUTO: 5.1 X10E3/UL (ref 1.4–7)
NEUTROPHILS NFR BLD AUTO: 61 %
PLATELET # BLD AUTO: 211 X10E3/UL (ref 150–450)
RBC # BLD AUTO: 4.73 X10E6/UL (ref 3.77–5.28)
WBC # BLD AUTO: 8.2 X10E3/UL (ref 3.4–10.8)

## 2022-06-08 ENCOUNTER — OFFICE VISIT (OUTPATIENT)
Dept: OBSTETRICS AND GYNECOLOGY | Age: 37
End: 2022-06-08

## 2022-06-08 VITALS
WEIGHT: 201 LBS | HEIGHT: 67 IN | SYSTOLIC BLOOD PRESSURE: 122 MMHG | BODY MASS INDEX: 31.55 KG/M2 | DIASTOLIC BLOOD PRESSURE: 78 MMHG

## 2022-06-08 DIAGNOSIS — Z11.51 SCREENING FOR HPV (HUMAN PAPILLOMAVIRUS): ICD-10-CM

## 2022-06-08 DIAGNOSIS — Z01.419 WELL WOMAN EXAM WITH ROUTINE GYNECOLOGICAL EXAM: Primary | ICD-10-CM

## 2022-06-08 DIAGNOSIS — N83.202 LEFT OVARIAN CYST: ICD-10-CM

## 2022-06-08 DIAGNOSIS — Z12.4 ENCOUNTER FOR PAPANICOLAOU SMEAR FOR CERVICAL CANCER SCREENING: ICD-10-CM

## 2022-06-08 PROCEDURE — 99395 PREV VISIT EST AGE 18-39: CPT | Performed by: PHYSICIAN ASSISTANT

## 2022-06-08 PROCEDURE — 3008F BODY MASS INDEX DOCD: CPT | Performed by: PHYSICIAN ASSISTANT

## 2022-06-08 RX ORDER — DROSPIRENONE AND ESTETROL 3-14.2(28)
1 KIT ORAL DAILY
Qty: 30 TABLET | Refills: 0 | COMMUNITY
Start: 2022-06-08 | End: 2022-09-08

## 2022-06-08 NOTE — PROGRESS NOTES
Subjective     Chief Complaint   Patient presents with   • Gynecologic Exam     Annual exam last pap 2021   • Ovarian Cyst     Follow up ultrasound from 05/10/2022 for ovarian cyst       History of Present Illness    Robert Beach is a 36 y.o.  who presents for annual exam.    She is here for annual exam  Also had u/s to re eval her ovarian cyst  She has been taking Nexstellis since last visit  Does say she had a horrible period last month    Has no c/o   Remote h/o abnormal pap    Became a grandma recently, 20 yo son had a baby with GF  Now 16 yo son's GF is pregnant    Her menses are regular every 28-30 days, lasting 4-7 days, dysmenorrhea none   Obstetric History:  OB History        8    Para   3    Term   2       1    AB   3    Living   4       SAB   1    IAB   1    Ectopic        Molar        Multiple   1    Live Births   4               Menstrual History:     Patient's last menstrual period was 2022 (exact date).         Current contraception: OCP (estrogen/progesterone)  History of abnormal Pap smear: yes -   Received Gardasil immunization: no  Perform regular self breast exam: yes - occl  Family history of uterine or ovarian cancer: no  Family History of colon cancer: no  Family history of breast cancer: yes - paternal aunt    Mammogram: not indicated.  Colonoscopy: not indicated.  DEXA: not indicated.    Exercise: not active  Calcium/Vitamin D: adequate intake    The following portions of the patient's history were reviewed and updated as appropriate: allergies, current medications, past family history, past medical history, past social history, past surgical history and problem list.    Review of Systems   Genitourinary:        Left ovarian cyst   All other systems reviewed and are negative.      Review of Systems   Constitutional: Negative for fatigue.   Respiratory: Negative for shortness of breath.    Gastrointestinal: Negative for abdominal pain.   Genitourinary:  "Negative for dysuria.   Neurological: Negative for headaches.   Psychiatric/Behavioral: Negative for dysphoric mood.         Objective   Physical Exam    /78   Ht 170.2 cm (67\")   Wt 91.2 kg (201 lb)   LMP 05/17/2022 (Exact Date)   Breastfeeding No   BMI 31.48 kg/m²   General:   alert, comfortable and no distress   Heart: regular rate and rhythm   Lungs: clear to auscultation bilaterally   Breast: normal appearance, no masses or tenderness, Inspection negative, No nipple retraction or dimpling, No nipple discharge or bleeding, No axillary or supraclavicular adenopathy, Normal to palpation without dominant masses   Neck: no adenopathy and no carotid bruit   Abdomen: normal findings: soft, non-tender   CVA: Not performed today   Pelvis: External genitalia: normal general appearance  Vaginal: normal mucosa without prolapse or lesions  Cervix: normal appearance and thin prep PAP obtained  Adnexa: normal bimanual exam  Uterus: normal single, nontender   Extremities: Not performed today   Neurologic: negative   Psychiatric: Normal affect, judgement, and mood     Assessment & Plan   Diagnoses and all orders for this visit:    1. Well woman exam with routine gynecological exam (Primary)    2. Left ovarian cyst    Other orders  -     Drospirenone-Estetrol (Nextstellis) 3-14.2 MG tablet; Take 1 tablet by mouth Daily.  Dispense: 30 tablet; Refill: 0        All questions answered.  Breast self exam technique reviewed and patient encouraged to perform self-exam monthly.  Discussed healthy lifestyle modifications.  Recommended 30 minutes of aerobic exercise five times per week.  Discussed calcium needs to prevent osteoporosis.      1.  Uterus: Normal size and with uterine volume of 147.79 ml    2.  Endometrium: Normal non-menopausal thickness and 10.90 mm     3.  Myometrium: Normal homogenous texture     4.  Ovaries   Left: Normal/unremarkable , Complex cystic mass 3.7 x 4.0 cm  and Suspected hemorrhagic corpus " luteum   Right: Normal/unremarkable        C/w nexstellis, another sample given  Plan rpt u/s in 4-6 wks with Dr Mitesh Allen done

## 2022-06-12 LAB
CYTOLOGIST CVX/VAG CYTO: NORMAL
CYTOLOGY CVX/VAG DOC CYTO: NORMAL
CYTOLOGY CVX/VAG DOC THIN PREP: NORMAL
DX ICD CODE: NORMAL
HIV 1 & 2 AB SER-IMP: NORMAL
HPV I/H RISK 4 DNA CVX QL PROBE+SIG AMP: NEGATIVE
OTHER STN SPEC: NORMAL
STAT OF ADQ CVX/VAG CYTO-IMP: NORMAL

## 2022-06-16 RX ORDER — ACYCLOVIR 400 MG/1
TABLET ORAL
Qty: 30 TABLET | Refills: 3 | Status: SHIPPED | OUTPATIENT
Start: 2022-06-16

## 2022-06-16 RX ORDER — ACYCLOVIR 400 MG/1
400 TABLET ORAL
Qty: 30 TABLET | Refills: 3 | OUTPATIENT
Start: 2022-06-16

## 2022-09-07 ENCOUNTER — CLINICAL SUPPORT (OUTPATIENT)
Dept: OBSTETRICS AND GYNECOLOGY | Age: 37
End: 2022-09-07

## 2022-09-07 DIAGNOSIS — N89.8 VAGINAL DISCHARGE: Primary | ICD-10-CM

## 2022-09-08 ENCOUNTER — OFFICE VISIT (OUTPATIENT)
Dept: OBSTETRICS AND GYNECOLOGY | Age: 37
End: 2022-09-08

## 2022-09-08 VITALS
WEIGHT: 193 LBS | SYSTOLIC BLOOD PRESSURE: 120 MMHG | BODY MASS INDEX: 30.29 KG/M2 | HEIGHT: 67 IN | DIASTOLIC BLOOD PRESSURE: 74 MMHG

## 2022-09-08 DIAGNOSIS — N89.8 VAGINAL DISCHARGE: Primary | ICD-10-CM

## 2022-09-08 PROCEDURE — 99213 OFFICE O/P EST LOW 20 MIN: CPT | Performed by: NURSE PRACTITIONER

## 2022-09-08 RX ORDER — DEXTROAMPHETAMINE SULFATE, DEXTROAMPHETAMINE SACCHARATE, AMPHETAMINE SULFATE AND AMPHETAMINE ASPARTATE 5; 5; 5; 5 MG/1; MG/1; MG/1; MG/1
CAPSULE, EXTENDED RELEASE ORAL
COMMUNITY
Start: 2022-08-31

## 2022-09-08 RX ORDER — FLUCONAZOLE 150 MG/1
150 TABLET ORAL ONCE
Qty: 1 TABLET | Refills: 0 | Status: SHIPPED | OUTPATIENT
Start: 2022-09-08 | End: 2022-09-08

## 2022-09-08 NOTE — PROGRESS NOTES
"Subjective     Chief Complaint   Patient presents with   • Gynecologic Exam     Green vaginal discharge, yeast       Robert Beach is a 36 y.o.  whose LMP is Patient's last menstrual period was 2022.     Pt presents today with chief complaint of abnormal vaginal discharge  States she noticed a yellow/green tint x 1   Since then it has just been white  She took a second dose of diflucan yesterday  She came in yesterday for urine std testing  She denies std risk      No Additional Complaints Reported    The following portions of the patient's history were reviewed and updated as appropriate:vital signs, allergies, current medications, past medical history, past social history, past surgical history and problem list      Review of Systems   Pertinent items are noted in HPI.     Objective      /74   Ht 170.2 cm (67\")   Wt 87.5 kg (193 lb)   LMP 2022   BMI 30.23 kg/m²     Physical Exam    General:   alert and no distress   Heart: Not performed today   Lungs: Not performed today.   Breast: Not performed today   Neck: na   Abdomen: {Not performed today   CVA: Not performed today   Pelvis: External genitalia: normal general appearance  Urinary system: urethral meatus normal  Vaginal: normal mucosa without prolapse or lesions, normal without tenderness, induration or masses, normal rugae and discharge, white  Cervix: normal appearance  Adnexa: normal bimanual exam  Uterus: normal single, nontender   Extremities: Not performed today   Neurologic: negative   Psychiatric: Normal affect, judgement, and mood       Lab Review   Labs: No data reviewed     Imaging   No data reviewed    Assessment & Plan     ASSESSMENT  1. Vaginal discharge        PLAN  1.   Orders Placed This Encounter   Procedures   • NuSwab BV & Candida - Swab, Vagina       2. Medications prescribed this encounter:        New Medications Ordered This Visit   Medications   • fluconazole (Diflucan) 150 MG tablet     Sig: Take 1 tablet by " mouth 1 (One) Time for 1 dose.     Dispense:  1 tablet     Refill:  0       3. STD testing pending. Swab done for BV and yeast. Will send one more dose of diflucan - to take Saturday if still having itching. Will call with results.    Follow up: MARILEE Esqueda, APRN  9/8/2022

## 2022-09-09 LAB
A VAGINAE DNA VAG QL NAA+PROBE: NORMAL SCORE
BVAB2 DNA VAG QL NAA+PROBE: NORMAL SCORE
C ALBICANS DNA VAG QL NAA+PROBE: NEGATIVE
C GLABRATA DNA VAG QL NAA+PROBE: NEGATIVE
MEGA1 DNA VAG QL NAA+PROBE: NORMAL SCORE

## 2022-09-20 ENCOUNTER — TELEPHONE (OUTPATIENT)
Dept: OBSTETRICS AND GYNECOLOGY | Age: 37
End: 2022-09-20

## 2022-09-20 DIAGNOSIS — Z32.01 POSITIVE PREGNANCY TEST: Primary | ICD-10-CM

## 2022-09-20 NOTE — TELEPHONE ENCOUNTER
Please have her c/I to do a blood pregnancy test. If negative, can monitor and then plan problem visit with u/s if c/w irregular bleeding.

## 2022-09-20 NOTE — TELEPHONE ENCOUNTER
----- Message from Robert Beach sent at 9/20/2022 11:55 AM EDT -----  Regarding: Missed period   So my period is currently late, not too late but my last period was around august 23rd.  Last week I had some bleeding with cramping and I thought it was my period but it in fact was not I stopped bleeding, but I’ve been having a lot of abdominal pain especially on my lower right side.  I took a pregnancy test the first one said positive but the 2nd line was faint. I took another one today and it was negative. Should I come in and be seen? Should I just wait it out see if my period comes?  This is kind of u common for me.

## 2023-07-25 ENCOUNTER — TELEPHONE (OUTPATIENT)
Dept: OBSTETRICS AND GYNECOLOGY | Age: 38
End: 2023-07-25
Payer: COMMERCIAL

## 2023-07-25 NOTE — TELEPHONE ENCOUNTER
Pt called stating she was seen for abdominal pain at [facility] last night, they were unable to do an ultrasound. Please advise on scheduling and when to schedule

## 2023-08-03 ENCOUNTER — OFFICE VISIT (OUTPATIENT)
Dept: OBSTETRICS AND GYNECOLOGY | Age: 38
End: 2023-08-03
Payer: COMMERCIAL

## 2023-08-03 VITALS
BODY MASS INDEX: 29.03 KG/M2 | DIASTOLIC BLOOD PRESSURE: 74 MMHG | HEIGHT: 67 IN | SYSTOLIC BLOOD PRESSURE: 118 MMHG | WEIGHT: 185 LBS

## 2023-08-03 DIAGNOSIS — R10.2 PELVIC PAIN: Primary | ICD-10-CM

## 2023-08-03 LAB
BILIRUB BLD-MCNC: NEGATIVE MG/DL
CLARITY, POC: CLEAR
COLOR UR: YELLOW
GLUCOSE UR STRIP-MCNC: NEGATIVE MG/DL
KETONES UR QL: NEGATIVE
LEUKOCYTE EST, POC: NEGATIVE
NITRITE UR-MCNC: NEGATIVE MG/ML
PH UR: 6 [PH] (ref 5–8)
PROT UR STRIP-MCNC: NEGATIVE MG/DL
RBC # UR STRIP: NEGATIVE /UL
SP GR UR: 1.01 (ref 1–1.03)
UROBILINOGEN UR QL: NORMAL

## 2024-01-01 NOTE — TELEPHONE ENCOUNTER
Admission History and Physical      ADMISSION DATE:  2024  ATTENDING PHYSICIAN:  Shannon Givens MD     Boy Tere Pennington named \"Raúl\" is a 1 day old male 8 lb 6.9 oz (3825 g) infant, delivered via Vaginal, Spontaneous [250] at Gestational Age: 39w0d on 2024 at 9:56 PM.    Pregnancy complicated by cHTN, mom on ASA.  Mom has history of cleft lip repair and scoliosis surgery.   No labor or delivery complications.   Since delivery, infant has transitioned easily to NBN.     MATERNAL INFORMATION:     Age, /Para, LIBBY:   Information for the patient's mother:  Tere Peninngton [[id number]]   32 year old          2024, by Patient Reported      steroids during pregnancy: None    Information for the patient's mother:  Tere Pennington [[id number]]     Social History     Tobacco Use    Smoking status: Never    Smokeless tobacco: Never   Substance Use Topics    Alcohol use: Not Currently     Maternal drug use: none    Information for the patient's mother:  Tere Pennington [[id number]]     Past Medical History:   Diagnosis Date    Essential (primary) hypertension     History of spinal fusion 2007    t1-l2    PONV (postoperative nausea and vomiting)        Family history: denies history of congenital heart disease, jaundice requiring phototherapy, or developmental dysplasia of the hip  Social history: baby will be living at home with Mom and Dad, parents do have crib and car seat, no smokers in the house     Prenatal Labs:  Information for the patient's mother:  Gorge Tere Okeefe [[id number]]     Recent Labs   Lab 24  0119 24  1737 24  0000 24  0000   HIV Antigen/Antibody Screen  --   --    < > Non-reactive   HIV Antigen/ Antibody Combo Screen  --  Nonreactive  --   --    RPR Nonreactive Nonreactive   < >  --    RPR Screen  --   --    < > Non-reactive   Rubella Antibody IgG  --   --   --  Immune    < > = values in this interval not displayed.  This was already done.         GBS: Negative No antibiotics needed.    Maternal blood type:   Information for the patient's mother:  Tere Pennington [[id number]]   O Rh Positive, Antibody screen: Negative  Baby blood type: A Rh Positive, CAPRICE Negative      BIRTH HISTORY:     Delivery Method: Vaginal, Spontaneous [250]   Rupture date & time: 2024 3:45 PM   Date & time of birth 2024 9:56 PM   Induction: Misoprostol;Oxytocin;AROM Medical Indication-Unlisted-See H&P for Details;Hypertension   Complications/ Risks None     Placenta appearance: Intact   Cord info/complications: 3 Vessels None       Delayed cord clamping: Yes   Indications for :     Presentation/position: Vertex         Forceps attempted? No     Vacuum attempted? No     Shoulder dystocia? No                            INFORMATION     Resuscitation:  Bulb Syringe          APGARS  One minute Five minutes   Skin color: 1  1    Heart rate: 2  2     Reflex: 2  2    Muscle tone: 2  2    Breathin  2    Totals:   9  9      Cord Blood/ Evaluation (CRD)  No results found  Blood gases sent? No   Cord pH No results found    Early onset sepsis screen:     Sepsis Risk Calculator Data      Flowsheet Row Admission (Current) from 2024 in [address] [facility]   Gestational age (weeks) 39 weeks   Gestational age (days) 0 days   Highest maternal antepartum temp (F) 98.8   ROM (hours) 6.2 hours   Maternal GBS status 2   Type of intrapartum antibiotics 0            Risk at Birth: 0.14  Risk - Well Appearin.06  Risk - Equivocal: 0.69  Risk - Clinical Illness: 2.93    Clinical Exam:  Well Appearing (no persistent physiologic abnormalities)    Plan for EOS  - EOS Risk after Clinical Exam: Less than 1 per 1,000 live births - No culture, No antibiotics, Routine Vitals  - Blood culture and CBC on admission - No  - No Antibiotics was initiated due to low risk of Sepsis.      PHYSICAL EXAM     VITALS: Visit Vitals  Pulse 130   Temp 98.4 °F (36.9  °C) (Axillary)   Resp 36   Ht 21.5\" (54.6 cm) Comment: Filed from Delivery Summary   Wt 3825 g (8 lb 6.9 oz) Comment: Filed from Delivery Summary   HC 33.5 cm (13.19\") Comment: Filed from Delivery Summary   BMI 12.83 kg/m²     Intake/Output         09/27 0700  09/28 0659 09/28 0700 09/29 0659          Unmeasured Urine Occurrence 1 x 1 x    Unmeasured Stool Occurrence 1 x 1 x            Birth Measurements:        Weight: 8 lb 6.9 oz (3825 g)        Length: 21.5\"        Head circumference: 33.5 cm    GENERAL:Baby Boy is an alert, vigorous male with appropriate behavior. He is in no acute distress.  SKIN: His skin is warm with normal turgor. The color of the skin is pink. There is no rash. There are no bruises or other signs of injury.  Significant jaundice is not present.  HEAD: The anterior fontanel is open and flat.  The head is atraumatic and normocephalic.   EYES: The conjunctivae appear normal with neither icterus nor subconjunctival hemorrhage.   Red reflexes are seen bilaterally.   EARS: Pinnae normal.  NOSE: There is no nasal flaring, nares patent bilaterally.  THROAT:  The oropharynx is normal.  There is no cleft of the palate.  NECK: Clavicles without crepitus.  TRUNK AND THORAX: There are no lesions on the trunk; there is no dimple over the presacral area. There are no retractions.  LUNGS: The lung fields are clear to auscultation.No grunting. No retractions.  HEART: The precordium is quiet. The heart rhythm is grossly regular. S1 and S2 are normal. There are no murmurs. Normal femoral pulses.  ABDOMEN: The umbilical cord stump is normal. There is not an umbilical hernia. The abdomen is soft, non-distended with normoactive bowel sounds. No masses.   GENITALIA: normal male genitalia with bilateral descended testes  RECTAL: Anus patent  EXTREMITIES: Moving all 4 extremities. The hip exam is normal  . There are no hip clicks or clunks.    NEUROLOGIC: He displays normal tone throughout. He is not jittery.  Normal suck reflex. Normal and symmetric bernie, palmar/plantar grasp reflexes.     ASSESSMENT     Patient Active Problem List   Diagnosis    Liveborn infant by vaginal delivery (CMD)     infant of 39 completed weeks of gestation (CMD)    [place] affected by maternal hypertensive disorder       Well 1 day old AGA male infant born at Gestational Age: 39w0d via Vaginal, Spontaneous [250] .Baby is clinically doing well.    PLAN     Routine  care and anticipatory guidance discussed.  Car seat safety discussed.   Continue to breast feed ad too based on feeding cues, not to exceeed 4 hours between feedings. Goal 8-12 feeds per day. Lactation consultation as needed.  [place] screen, critical congenital heart disease screening, and hearing screening to be completed prior to discharge.  Bilirubin: TcB to be checked at 24 hours of life. Bilirubin level is not yet determined. Monitor for clinically significant jaundice. TcB/TSB as appropriate.  Hepatitis B vaccine ordered. To be given if parent consents.  Safe sleep discussion started with parents.  Parents have crib/bassinet at home.   Cleared for circumcision.     Discharge Planning: Anticipate follow-up with Lana Madrigal DO in 1-3 days following discharge.    [place] plan of care discussed in detail with family and team, all questions answered.  This encounter was completed with the aid of audio/video : Linette Givens MD  Pediatric Hospitalist Attending  Please contact through [employer]  2024 9:38 AM

## 2024-02-06 ENCOUNTER — OFFICE VISIT (OUTPATIENT)
Dept: OBSTETRICS AND GYNECOLOGY | Age: 39
End: 2024-02-06
Payer: COMMERCIAL

## 2024-02-06 VITALS
BODY MASS INDEX: 27.94 KG/M2 | DIASTOLIC BLOOD PRESSURE: 78 MMHG | SYSTOLIC BLOOD PRESSURE: 128 MMHG | WEIGHT: 178 LBS | HEIGHT: 67 IN

## 2024-02-06 DIAGNOSIS — N92.6 IRREGULAR MENSES: Primary | ICD-10-CM

## 2024-02-06 PROCEDURE — 99213 OFFICE O/P EST LOW 20 MIN: CPT | Performed by: PHYSICIAN ASSISTANT

## 2024-02-06 PROCEDURE — 1159F MED LIST DOCD IN RCRD: CPT | Performed by: PHYSICIAN ASSISTANT

## 2024-02-06 PROCEDURE — 1160F RVW MEDS BY RX/DR IN RCRD: CPT | Performed by: PHYSICIAN ASSISTANT

## 2024-02-06 NOTE — PROGRESS NOTES
"Subjective     Chief Complaint   Patient presents with    Menstrual Problem     C/o vaginal bleeding and cramping. Ultrasound done today.  Started period Friday and then Friday evening she stood up and flooded and happened again Saturday.  Had very large clots as well.        Robert Beach is a 38 y.o.  whose LMP is Patient's last menstrual period was 2024 (exact date). presents with heavy menses      Period came as expected but was heavy  Had a large clot (showed a picture of it)  Bleeding started as normal on Friday  That night she passed the large clot and had cramping  Was bleeding heavy after that   Had another episode on Saturday of really heavy bleeding and called the on call doc  She monitored after that but had no more bleeding    She is SA   She is not on BC  Does not want to be pregnant    Doesn't tolerate BC (causes dizziness) or IUD's (cysts)  Is considering an ablation and a tubal    No Additional Complaints Reported    The following portions of the patient's history were reviewed and updated as appropriate:no additional history reviewed, vital signs, allergies, current medications, past medical history, past social history, past surgical history, and problem list      Review of Systems   Genitourinary:positive for abnormal menstrual periods     Objective      /78   Ht 170.2 cm (67\")   Wt 80.7 kg (178 lb)   LMP 2024 (Exact Date)   BMI 27.88 kg/m²     Physical Exam    General:   alert, comfortable, and no distress   Heart: Not performed today   Lungs: Not performed today.   Breast: Not performed today   Neck: Not performed today   Abdomen: Not performed today   CVA: Not performed today   Pelvis: Not performed today   Extremities: Not performed today   Neurologic: negative   Psychiatric: Normal affect, judgement, and mood       Lab Review   Labs: No data reviewed    Imaging   Ultrasound - Pelvic Vaginal  1.  Uterus: Normal size, with uterine volume of 121 ml, with uterine " dimensions 8 x 6 x 4 cm, and Retroverted     2.  Endometrium:  9 mm      3.  Myometrium: Normal homogenous texture      4.  Ovaries             Left: Normal/unremarkable  and with ovarian volume: 7.8 ml              Right: Normal/unremarkable , Normal small follicles, and with ovarian volume: 4.4 ml      Assessment & Plan     ASSESSMENT  1. Irregular menses          PLAN  1.   Orders Placed This Encounter   Procedures    Ferritin    HCG, B-subunit, Quantitative    CBC & Differential       2. Labs ordered. Recent set of labs done at the end of January and all wnl. Will schedule a pre op discussion to discuss salpingectomy and whether she is a good candidate for an ablation    Follow up: PERICO Villarreal  2/6/2024

## 2024-02-07 LAB
BASOPHILS # BLD AUTO: 0.06 10*3/MM3 (ref 0–0.2)
BASOPHILS NFR BLD AUTO: 0.9 % (ref 0–1.5)
EOSINOPHIL # BLD AUTO: 0.09 10*3/MM3 (ref 0–0.4)
EOSINOPHIL NFR BLD AUTO: 1.4 % (ref 0.3–6.2)
ERYTHROCYTE [DISTWIDTH] IN BLOOD BY AUTOMATED COUNT: 11.8 % (ref 12.3–15.4)
FERRITIN SERPL-MCNC: 17.4 NG/ML (ref 13–150)
HCG INTACT+B SERPL-ACNC: <1 MIU/ML
HCT VFR BLD AUTO: 40 % (ref 34–46.6)
HGB BLD-MCNC: 13.3 G/DL (ref 12–15.9)
IMM GRANULOCYTES # BLD AUTO: 0.01 10*3/MM3 (ref 0–0.05)
IMM GRANULOCYTES NFR BLD AUTO: 0.2 % (ref 0–0.5)
LYMPHOCYTES # BLD AUTO: 1.79 10*3/MM3 (ref 0.7–3.1)
LYMPHOCYTES NFR BLD AUTO: 27.5 % (ref 19.6–45.3)
MCH RBC QN AUTO: 28.7 PG (ref 26.6–33)
MCHC RBC AUTO-ENTMCNC: 33.3 G/DL (ref 31.5–35.7)
MCV RBC AUTO: 86.4 FL (ref 79–97)
MONOCYTES # BLD AUTO: 0.55 10*3/MM3 (ref 0.1–0.9)
MONOCYTES NFR BLD AUTO: 8.5 % (ref 5–12)
NEUTROPHILS # BLD AUTO: 4 10*3/MM3 (ref 1.7–7)
NEUTROPHILS NFR BLD AUTO: 61.5 % (ref 42.7–76)
NRBC BLD AUTO-RTO: 0 /100 WBC (ref 0–0.2)
PLATELET # BLD AUTO: 207 10*3/MM3 (ref 140–450)
RBC # BLD AUTO: 4.63 10*6/MM3 (ref 3.77–5.28)
WBC # BLD AUTO: 6.5 10*3/MM3 (ref 3.4–10.8)

## 2024-02-12 ENCOUNTER — OFFICE VISIT (OUTPATIENT)
Dept: OBSTETRICS AND GYNECOLOGY | Age: 39
End: 2024-02-12
Payer: COMMERCIAL

## 2024-02-12 VITALS
HEIGHT: 67 IN | DIASTOLIC BLOOD PRESSURE: 74 MMHG | BODY MASS INDEX: 27.78 KG/M2 | WEIGHT: 177 LBS | SYSTOLIC BLOOD PRESSURE: 122 MMHG

## 2024-02-12 DIAGNOSIS — Z11.51 SCREENING FOR HPV (HUMAN PAPILLOMAVIRUS): ICD-10-CM

## 2024-02-12 DIAGNOSIS — Z80.3 FAMILY HISTORY OF BREAST CANCER: ICD-10-CM

## 2024-02-12 DIAGNOSIS — Z11.3 SCREEN FOR STD (SEXUALLY TRANSMITTED DISEASE): ICD-10-CM

## 2024-02-12 DIAGNOSIS — Z12.4 ENCOUNTER FOR PAPANICOLAOU SMEAR FOR CERVICAL CANCER SCREENING: ICD-10-CM

## 2024-02-12 DIAGNOSIS — N60.01 BILATERAL BREAST CYSTS: ICD-10-CM

## 2024-02-12 DIAGNOSIS — N60.02 BILATERAL BREAST CYSTS: ICD-10-CM

## 2024-02-12 DIAGNOSIS — Z01.419 WELL WOMAN EXAM WITH ROUTINE GYNECOLOGICAL EXAM: Primary | ICD-10-CM

## 2024-02-12 PROCEDURE — 99459 PELVIC EXAMINATION: CPT | Performed by: PHYSICIAN ASSISTANT

## 2024-02-12 PROCEDURE — 99395 PREV VISIT EST AGE 18-39: CPT | Performed by: PHYSICIAN ASSISTANT

## 2024-02-12 PROCEDURE — 1160F RVW MEDS BY RX/DR IN RCRD: CPT | Performed by: PHYSICIAN ASSISTANT

## 2024-02-12 PROCEDURE — 2014F MENTAL STATUS ASSESS: CPT | Performed by: PHYSICIAN ASSISTANT

## 2024-02-12 PROCEDURE — 1159F MED LIST DOCD IN RCRD: CPT | Performed by: PHYSICIAN ASSISTANT

## 2024-02-14 LAB
C TRACH RRNA CVX QL NAA+PROBE: NEGATIVE
CYTOLOGIST CVX/VAG CYTO: NORMAL
CYTOLOGY CVX/VAG DOC CYTO: NORMAL
CYTOLOGY CVX/VAG DOC THIN PREP: NORMAL
DX ICD CODE: NORMAL
HIV 1 & 2 AB SER-IMP: NORMAL
HPV I/H RISK 4 DNA CVX QL PROBE+SIG AMP: NEGATIVE
N GONORRHOEA RRNA CVX QL NAA+PROBE: NEGATIVE
OTHER STN SPEC: NORMAL
STAT OF ADQ CVX/VAG CYTO-IMP: NORMAL
T VAGINALIS RRNA SPEC QL NAA+PROBE: NEGATIVE

## 2024-02-16 ENCOUNTER — APPOINTMENT (OUTPATIENT)
Dept: WOMENS IMAGING | Facility: HOSPITAL | Age: 39
End: 2024-02-16
Payer: COMMERCIAL

## 2024-02-16 PROCEDURE — G0279 TOMOSYNTHESIS, MAMMO: HCPCS | Performed by: RADIOLOGY

## 2024-02-16 PROCEDURE — 76642 ULTRASOUND BREAST LIMITED: CPT | Performed by: RADIOLOGY

## 2024-02-16 PROCEDURE — 77062 BREAST TOMOSYNTHESIS BI: CPT | Performed by: RADIOLOGY

## 2024-02-16 PROCEDURE — 77066 DX MAMMO INCL CAD BI: CPT | Performed by: RADIOLOGY

## 2024-02-27 DIAGNOSIS — N63.10 MASS OF RIGHT BREAST, UNSPECIFIED QUADRANT: Primary | ICD-10-CM

## 2024-02-29 ENCOUNTER — OFFICE VISIT (OUTPATIENT)
Dept: OBSTETRICS AND GYNECOLOGY | Age: 39
End: 2024-02-29
Payer: COMMERCIAL

## 2024-02-29 VITALS
WEIGHT: 179 LBS | SYSTOLIC BLOOD PRESSURE: 108 MMHG | HEIGHT: 67 IN | BODY MASS INDEX: 28.09 KG/M2 | DIASTOLIC BLOOD PRESSURE: 74 MMHG

## 2024-02-29 DIAGNOSIS — Z30.09 STERILIZATION CONSULT: ICD-10-CM

## 2024-02-29 DIAGNOSIS — N92.0 MENORRHAGIA WITH REGULAR CYCLE: Primary | ICD-10-CM

## 2024-02-29 RX ORDER — SODIUM CHLORIDE 0.9 % (FLUSH) 0.9 %
10 SYRINGE (ML) INJECTION EVERY 12 HOURS SCHEDULED
OUTPATIENT
Start: 2024-04-25

## 2024-02-29 RX ORDER — SODIUM CHLORIDE 9 MG/ML
40 INJECTION, SOLUTION INTRAVENOUS AS NEEDED
OUTPATIENT
Start: 2024-04-25

## 2024-02-29 RX ORDER — SODIUM CHLORIDE 0.9 % (FLUSH) 0.9 %
1-10 SYRINGE (ML) INJECTION AS NEEDED
OUTPATIENT
Start: 2024-04-25

## 2024-02-29 NOTE — PROGRESS NOTES
"  Chief complaint-heavy cycles and desire for permanent sterilization.    History of present illness- Patient is a 38 y.o.  who has complaints of intermittent heavy cycles.  Her cycles are regular and last about 4 days with clots.  She has mild dysmenorrhea.  Patient desires permanent sterilization.  She is sure of her decision.    Patient is scheduled for a breast cyst aspiration soon.    She has a history of PVCs but saw [facility] and had a normal Holter and echo.      She feels like she is doing much better on Adderall and does not take clonazepam anymore.     Review of Systems   Constitutional: Negative for fatigue.   Respiratory: Negative for shortness of breath.    Gastrointestinal: Negative for abdominal pain.   Genitourinary: Negative for dysuria.   Neurological: Negative for headaches.   Psychiatric/Behavioral: Negative for dysphoric mood.     /74   Ht 170.2 cm (67\")   Wt 81.2 kg (179 lb)   LMP 2024 (Exact Date)   BMI 28.04 kg/m²   Physical Exam  Constitutional:       Appearance: Normal appearance.   Pulmonary:      Effort: Pulmonary effort is normal.   Abdominal:      General: Abdomen is flat.      Palpations: Abdomen is soft.   Genitourinary:     Comments: Uterus is retroverted and nontender.  No adnexal masses are palpated.  Neurological:      Mental Status: She is alert.   Psychiatric:         Mood and Affect: Mood normal.         Thought Content: Thought content normal.         Judgment: Judgment normal.       Procedure note-after verbal consent the patient cervix was prepped with Betadine x 3.  Cervical dilator was used to dilate the cervix while stabilizing the cervix with an Allis clamp.  Pipelle was then placed to the fundus and good sample is obtained and sent to pathology.  Allis clamp was removed.  Patient tolerated well.    Pelvic ultrasound done recently shows a retroverted normal-sized uterus with no ovarian cyst.  Uterus measures 8 cm in length.    Recent Pap " was normal.    Diagnoses and all orders for this visit:    1. Menorrhagia with regular cycle (Primary)  -     Reference Histopathology    2. Sterilization consult    Patient desires bilateral salpingectomy and ablation.  We discussed alternatives.  She did try the Mirena in the past but had a lot of irregular bleeding and does not want to try that again.  We also discussed other options.  Patient prefers to proceed with salpingectomy and ablation.  We discussed the procedure in detail with diagrams.  We discussed risk of the procedure including but not limited to risk of anesthesia, bleeding, infection, uterine perforation and damage to surrounding organs.  Patient wished to proceed.

## 2024-03-06 ENCOUNTER — APPOINTMENT (OUTPATIENT)
Dept: WOMENS IMAGING | Facility: HOSPITAL | Age: 39
End: 2024-03-06
Payer: COMMERCIAL

## 2024-03-06 PROCEDURE — 88112 CYTOPATH CELL ENHANCE TECH: CPT | Performed by: PHYSICIAN ASSISTANT

## 2024-03-06 PROCEDURE — 88305 TISSUE EXAM BY PATHOLOGIST: CPT | Performed by: PHYSICIAN ASSISTANT

## 2024-03-06 PROCEDURE — 76942 ECHO GUIDE FOR BIOPSY: CPT | Performed by: RADIOLOGY

## 2024-03-06 PROCEDURE — 19000 PUNCTURE ASPIR CYST BREAST: CPT | Performed by: RADIOLOGY

## 2024-03-07 ENCOUNTER — LAB REQUISITION (OUTPATIENT)
Dept: LAB | Facility: HOSPITAL | Age: 39
End: 2024-03-07
Payer: COMMERCIAL

## 2024-03-07 DIAGNOSIS — N63.0 UNSPECIFIED LUMP IN UNSPECIFIED BREAST: ICD-10-CM

## 2024-03-08 LAB
CYTO UR: NORMAL
DX PRELIMINARY: NORMAL
LAB AP CASE REPORT: NORMAL
PATH REPORT.FINAL DX SPEC: NORMAL
PATH REPORT.GROSS SPEC: NORMAL

## 2024-04-22 ENCOUNTER — PRE-ADMISSION TESTING (OUTPATIENT)
Dept: PREADMISSION TESTING | Facility: HOSPITAL | Age: 39
End: 2024-04-22
Payer: COMMERCIAL

## 2024-04-22 VITALS
HEIGHT: 67 IN | BODY MASS INDEX: 27.94 KG/M2 | HEART RATE: 88 BPM | WEIGHT: 178 LBS | RESPIRATION RATE: 18 BRPM | TEMPERATURE: 97.4 F | SYSTOLIC BLOOD PRESSURE: 144 MMHG | OXYGEN SATURATION: 98 % | DIASTOLIC BLOOD PRESSURE: 89 MMHG

## 2024-04-22 DIAGNOSIS — D69.6 LOW PLATELET COUNT: Primary | ICD-10-CM

## 2024-04-22 LAB
ANION GAP SERPL CALCULATED.3IONS-SCNC: 11.6 MMOL/L (ref 5–15)
BUN SERPL-MCNC: 7 MG/DL (ref 6–20)
BUN/CREAT SERPL: 7.4 (ref 7–25)
CALCIUM SPEC-SCNC: 9.3 MG/DL (ref 8.6–10.5)
CHLORIDE SERPL-SCNC: 103 MMOL/L (ref 98–107)
CO2 SERPL-SCNC: 21.4 MMOL/L (ref 22–29)
CREAT SERPL-MCNC: 0.95 MG/DL (ref 0.57–1)
EGFRCR SERPLBLD CKD-EPI 2021: 78.8 ML/MIN/1.73
GLUCOSE SERPL-MCNC: 99 MG/DL (ref 65–99)
HCG SERPL QL: NEGATIVE
POTASSIUM SERPL-SCNC: 4.1 MMOL/L (ref 3.5–5.2)
SODIUM SERPL-SCNC: 136 MMOL/L (ref 136–145)

## 2024-04-22 PROCEDURE — 80048 BASIC METABOLIC PNL TOTAL CA: CPT

## 2024-04-22 PROCEDURE — 84703 CHORIONIC GONADOTROPIN ASSAY: CPT | Performed by: OBSTETRICS & GYNECOLOGY

## 2024-04-22 PROCEDURE — 36415 COLL VENOUS BLD VENIPUNCTURE: CPT

## 2024-04-22 PROCEDURE — 85025 COMPLETE CBC W/AUTO DIFF WBC: CPT | Performed by: OBSTETRICS & GYNECOLOGY

## 2024-04-22 RX ORDER — UBROGEPANT 100 MG/1
100 TABLET ORAL AS NEEDED
COMMUNITY
Start: 2024-04-06

## 2024-04-22 NOTE — DISCHARGE INSTRUCTIONS

## 2024-04-23 LAB
ERYTHROCYTE [DISTWIDTH] IN BLOOD BY AUTOMATED COUNT: 11.9 % (ref 12.3–15.4)
HCT VFR BLD AUTO: 37.9 % (ref 34–46.6)
HGB BLD-MCNC: 12.4 G/DL (ref 12–15.9)
MCH RBC QN AUTO: 27.6 PG (ref 26.6–33)
MCHC RBC AUTO-ENTMCNC: 32.7 G/DL (ref 31.5–35.7)
MCV RBC AUTO: 84.2 FL (ref 79–97)
PLATELET # BLD AUTO: 201 10*3/MM3 (ref 140–450)
RBC # BLD AUTO: 4.5 10*6/MM3 (ref 3.77–5.28)
WBC # BLD AUTO: 6.47 10*3/MM3 (ref 3.4–10.8)

## 2024-04-23 PROCEDURE — S0260 H&P FOR SURGERY: HCPCS | Performed by: OBSTETRICS & GYNECOLOGY

## 2024-04-23 PROCEDURE — 58661 LAPAROSCOPY REMOVE ADNEXA: CPT | Performed by: OBSTETRICS & GYNECOLOGY

## 2024-04-23 PROCEDURE — 58563 HYSTEROSCOPY ABLATION: CPT | Performed by: OBSTETRICS & GYNECOLOGY

## 2024-04-25 ENCOUNTER — ANESTHESIA EVENT (OUTPATIENT)
Dept: PERIOP | Facility: HOSPITAL | Age: 39
End: 2024-04-25
Payer: COMMERCIAL

## 2024-04-25 ENCOUNTER — HOSPITAL ENCOUNTER (OUTPATIENT)
Facility: HOSPITAL | Age: 39
Setting detail: HOSPITAL OUTPATIENT SURGERY
Discharge: HOME | End: 2024-04-25
Attending: OBSTETRICS & GYNECOLOGY | Admitting: OBSTETRICS & GYNECOLOGY
Payer: COMMERCIAL

## 2024-04-25 ENCOUNTER — ANESTHESIA (OUTPATIENT)
Dept: PERIOP | Facility: HOSPITAL | Age: 39
End: 2024-04-25
Payer: COMMERCIAL

## 2024-04-25 VITALS
RESPIRATION RATE: 14 BRPM | OXYGEN SATURATION: 100 % | SYSTOLIC BLOOD PRESSURE: 127 MMHG | DIASTOLIC BLOOD PRESSURE: 77 MMHG | HEIGHT: 67 IN | TEMPERATURE: 97 F | HEART RATE: 66 BPM | BODY MASS INDEX: 27.88 KG/M2

## 2024-04-25 DIAGNOSIS — Z30.09 STERILIZATION CONSULT: ICD-10-CM

## 2024-04-25 DIAGNOSIS — N92.0 MENORRHAGIA WITH REGULAR CYCLE: ICD-10-CM

## 2024-04-25 PROCEDURE — 25010000002 DEXAMETHASONE SODIUM PHOSPHATE 20 MG/5ML SOLUTION: Performed by: NURSE ANESTHETIST, CERTIFIED REGISTERED

## 2024-04-25 PROCEDURE — 88302 TISSUE EXAM BY PATHOLOGIST: CPT | Performed by: OBSTETRICS & GYNECOLOGY

## 2024-04-25 PROCEDURE — 25010000002 KETOROLAC TROMETHAMINE PER 15 MG: Performed by: NURSE ANESTHETIST, CERTIFIED REGISTERED

## 2024-04-25 PROCEDURE — 25010000002 FENTANYL CITRATE (PF) 50 MCG/ML SOLUTION: Performed by: NURSE ANESTHETIST, CERTIFIED REGISTERED

## 2024-04-25 PROCEDURE — 25810000003 SODIUM CHLORIDE PER 500 ML: Performed by: OBSTETRICS & GYNECOLOGY

## 2024-04-25 PROCEDURE — 25010000002 MIDAZOLAM PER 1 MG: Performed by: ANESTHESIOLOGY

## 2024-04-25 PROCEDURE — 25010000002 ONDANSETRON PER 1 MG: Performed by: NURSE ANESTHETIST, CERTIFIED REGISTERED

## 2024-04-25 PROCEDURE — 25010000002 PROPOFOL 200 MG/20ML EMULSION: Performed by: NURSE ANESTHETIST, CERTIFIED REGISTERED

## 2024-04-25 PROCEDURE — 25010000002 CEFAZOLIN PER 500 MG: Performed by: OBSTETRICS & GYNECOLOGY

## 2024-04-25 PROCEDURE — 25010000002 BUPIVACAINE 0.25 % SOLUTION: Performed by: OBSTETRICS & GYNECOLOGY

## 2024-04-25 PROCEDURE — 25010000002 HYDROMORPHONE PER 4 MG: Performed by: NURSE ANESTHETIST, CERTIFIED REGISTERED

## 2024-04-25 PROCEDURE — 25010000002 SUGAMMADEX 200 MG/2ML SOLUTION: Performed by: NURSE ANESTHETIST, CERTIFIED REGISTERED

## 2024-04-25 PROCEDURE — 58661 LAPAROSCOPY REMOVE ADNEXA: CPT | Performed by: OBSTETRICS & GYNECOLOGY

## 2024-04-25 PROCEDURE — 25810000003 LACTATED RINGERS PER 1000 ML: Performed by: ANESTHESIOLOGY

## 2024-04-25 PROCEDURE — 58563 HYSTEROSCOPY ABLATION: CPT | Performed by: OBSTETRICS & GYNECOLOGY

## 2024-04-25 RX ORDER — MAGNESIUM HYDROXIDE 1200 MG/15ML
LIQUID ORAL AS NEEDED
Status: DISCONTINUED | OUTPATIENT
Start: 2024-04-25 | End: 2024-04-25 | Stop reason: HOSPADM

## 2024-04-25 RX ORDER — EPHEDRINE SULFATE 50 MG/ML
5 INJECTION, SOLUTION INTRAVENOUS ONCE AS NEEDED
Status: DISCONTINUED | OUTPATIENT
Start: 2024-04-25 | End: 2024-04-25 | Stop reason: HOSPADM

## 2024-04-25 RX ORDER — SODIUM CHLORIDE 0.9 % (FLUSH) 0.9 %
3 SYRINGE (ML) INJECTION EVERY 12 HOURS SCHEDULED
Status: DISCONTINUED | OUTPATIENT
Start: 2024-04-25 | End: 2024-04-25 | Stop reason: HOSPADM

## 2024-04-25 RX ORDER — SODIUM CHLORIDE, SODIUM LACTATE, POTASSIUM CHLORIDE, CALCIUM CHLORIDE 600; 310; 30; 20 MG/100ML; MG/100ML; MG/100ML; MG/100ML
9 INJECTION, SOLUTION INTRAVENOUS CONTINUOUS
Status: DISCONTINUED | OUTPATIENT
Start: 2024-04-25 | End: 2024-04-25 | Stop reason: HOSPADM

## 2024-04-25 RX ORDER — PROPOFOL 10 MG/ML
INJECTION, EMULSION INTRAVENOUS AS NEEDED
Status: DISCONTINUED | OUTPATIENT
Start: 2024-04-25 | End: 2024-04-25 | Stop reason: SURG

## 2024-04-25 RX ORDER — PROMETHAZINE HYDROCHLORIDE 25 MG/1
25 TABLET ORAL ONCE AS NEEDED
Status: DISCONTINUED | OUTPATIENT
Start: 2024-04-25 | End: 2024-04-25 | Stop reason: HOSPADM

## 2024-04-25 RX ORDER — HYDROMORPHONE HYDROCHLORIDE 1 MG/ML
0.5 INJECTION, SOLUTION INTRAMUSCULAR; INTRAVENOUS; SUBCUTANEOUS
Status: DISCONTINUED | OUTPATIENT
Start: 2024-04-25 | End: 2024-04-25 | Stop reason: HOSPADM

## 2024-04-25 RX ORDER — KETAMINE HCL IN NACL, ISO-OSM 100MG/10ML
SYRINGE (ML) INJECTION AS NEEDED
Status: DISCONTINUED | OUTPATIENT
Start: 2024-04-25 | End: 2024-04-25 | Stop reason: SURG

## 2024-04-25 RX ORDER — LIDOCAINE HYDROCHLORIDE 10 MG/ML
0.5 INJECTION, SOLUTION INFILTRATION; PERINEURAL ONCE AS NEEDED
Status: DISCONTINUED | OUTPATIENT
Start: 2024-04-25 | End: 2024-04-25 | Stop reason: HOSPADM

## 2024-04-25 RX ORDER — DEXAMETHASONE SODIUM PHOSPHATE 4 MG/ML
INJECTION, SOLUTION INTRA-ARTICULAR; INTRALESIONAL; INTRAMUSCULAR; INTRAVENOUS; SOFT TISSUE AS NEEDED
Status: DISCONTINUED | OUTPATIENT
Start: 2024-04-25 | End: 2024-04-25 | Stop reason: SURG

## 2024-04-25 RX ORDER — FENTANYL CITRATE 50 UG/ML
50 INJECTION, SOLUTION INTRAMUSCULAR; INTRAVENOUS ONCE AS NEEDED
Status: DISCONTINUED | OUTPATIENT
Start: 2024-04-25 | End: 2024-04-25 | Stop reason: HOSPADM

## 2024-04-25 RX ORDER — FENTANYL CITRATE 50 UG/ML
INJECTION, SOLUTION INTRAMUSCULAR; INTRAVENOUS AS NEEDED
Status: DISCONTINUED | OUTPATIENT
Start: 2024-04-25 | End: 2024-04-25 | Stop reason: SURG

## 2024-04-25 RX ORDER — METOPROLOL TARTRATE 1 MG/ML
INJECTION, SOLUTION INTRAVENOUS AS NEEDED
Status: DISCONTINUED | OUTPATIENT
Start: 2024-04-25 | End: 2024-04-25 | Stop reason: SURG

## 2024-04-25 RX ORDER — FLUMAZENIL 0.1 MG/ML
0.2 INJECTION INTRAVENOUS AS NEEDED
Status: DISCONTINUED | OUTPATIENT
Start: 2024-04-25 | End: 2024-04-25 | Stop reason: HOSPADM

## 2024-04-25 RX ORDER — HYDRALAZINE HYDROCHLORIDE 20 MG/ML
5 INJECTION INTRAMUSCULAR; INTRAVENOUS
Status: DISCONTINUED | OUTPATIENT
Start: 2024-04-25 | End: 2024-04-25 | Stop reason: HOSPADM

## 2024-04-25 RX ORDER — IPRATROPIUM BROMIDE AND ALBUTEROL SULFATE 2.5; .5 MG/3ML; MG/3ML
3 SOLUTION RESPIRATORY (INHALATION) ONCE AS NEEDED
Status: DISCONTINUED | OUTPATIENT
Start: 2024-04-25 | End: 2024-04-25 | Stop reason: HOSPADM

## 2024-04-25 RX ORDER — HYDROCODONE BITARTRATE AND ACETAMINOPHEN 5; 325 MG/1; MG/1
1 TABLET ORAL ONCE AS NEEDED
Status: COMPLETED | OUTPATIENT
Start: 2024-04-25 | End: 2024-04-25

## 2024-04-25 RX ORDER — ROCURONIUM BROMIDE 10 MG/ML
INJECTION, SOLUTION INTRAVENOUS AS NEEDED
Status: DISCONTINUED | OUTPATIENT
Start: 2024-04-25 | End: 2024-04-25 | Stop reason: SURG

## 2024-04-25 RX ORDER — MIDAZOLAM HYDROCHLORIDE 1 MG/ML
1 INJECTION INTRAMUSCULAR; INTRAVENOUS
Status: DISCONTINUED | OUTPATIENT
Start: 2024-04-25 | End: 2024-04-25 | Stop reason: HOSPADM

## 2024-04-25 RX ORDER — NALOXONE HCL 0.4 MG/ML
0.2 VIAL (ML) INJECTION AS NEEDED
Status: DISCONTINUED | OUTPATIENT
Start: 2024-04-25 | End: 2024-04-25 | Stop reason: HOSPADM

## 2024-04-25 RX ORDER — KETOROLAC TROMETHAMINE 30 MG/ML
INJECTION, SOLUTION INTRAMUSCULAR; INTRAVENOUS AS NEEDED
Status: DISCONTINUED | OUTPATIENT
Start: 2024-04-25 | End: 2024-04-25 | Stop reason: SURG

## 2024-04-25 RX ORDER — SODIUM CHLORIDE 0.9 % (FLUSH) 0.9 %
3-10 SYRINGE (ML) INJECTION AS NEEDED
Status: DISCONTINUED | OUTPATIENT
Start: 2024-04-25 | End: 2024-04-25 | Stop reason: HOSPADM

## 2024-04-25 RX ORDER — LABETALOL HYDROCHLORIDE 5 MG/ML
5 INJECTION, SOLUTION INTRAVENOUS
Status: DISCONTINUED | OUTPATIENT
Start: 2024-04-25 | End: 2024-04-25 | Stop reason: HOSPADM

## 2024-04-25 RX ORDER — BUPIVACAINE HYDROCHLORIDE 2.5 MG/ML
INJECTION, SOLUTION INFILTRATION; PERINEURAL AS NEEDED
Status: DISCONTINUED | OUTPATIENT
Start: 2024-04-25 | End: 2024-04-25 | Stop reason: HOSPADM

## 2024-04-25 RX ORDER — SODIUM CHLORIDE 9 MG/ML
INJECTION, SOLUTION INTRAVENOUS AS NEEDED
Status: DISCONTINUED | OUTPATIENT
Start: 2024-04-25 | End: 2024-04-25 | Stop reason: HOSPADM

## 2024-04-25 RX ORDER — FAMOTIDINE 10 MG/ML
20 INJECTION, SOLUTION INTRAVENOUS ONCE
Status: COMPLETED | OUTPATIENT
Start: 2024-04-25 | End: 2024-04-25

## 2024-04-25 RX ORDER — SODIUM CHLORIDE 0.9 % (FLUSH) 0.9 %
10 SYRINGE (ML) INJECTION EVERY 12 HOURS SCHEDULED
Status: DISCONTINUED | OUTPATIENT
Start: 2024-04-25 | End: 2024-04-25 | Stop reason: HOSPADM

## 2024-04-25 RX ORDER — HYDROCODONE BITARTRATE AND ACETAMINOPHEN 5; 325 MG/1; MG/1
1 TABLET ORAL EVERY 6 HOURS PRN
Qty: 8 TABLET | Refills: 0 | Status: SHIPPED | OUTPATIENT
Start: 2024-04-25

## 2024-04-25 RX ORDER — DIPHENHYDRAMINE HYDROCHLORIDE 50 MG/ML
12.5 INJECTION INTRAMUSCULAR; INTRAVENOUS
Status: DISCONTINUED | OUTPATIENT
Start: 2024-04-25 | End: 2024-04-25 | Stop reason: HOSPADM

## 2024-04-25 RX ORDER — LIDOCAINE HYDROCHLORIDE 20 MG/ML
INJECTION, SOLUTION INFILTRATION; PERINEURAL AS NEEDED
Status: DISCONTINUED | OUTPATIENT
Start: 2024-04-25 | End: 2024-04-25 | Stop reason: SURG

## 2024-04-25 RX ORDER — FENTANYL CITRATE 50 UG/ML
50 INJECTION, SOLUTION INTRAMUSCULAR; INTRAVENOUS
Status: DISCONTINUED | OUTPATIENT
Start: 2024-04-25 | End: 2024-04-25 | Stop reason: HOSPADM

## 2024-04-25 RX ORDER — DROPERIDOL 2.5 MG/ML
0.62 INJECTION, SOLUTION INTRAMUSCULAR; INTRAVENOUS
Status: DISCONTINUED | OUTPATIENT
Start: 2024-04-25 | End: 2024-04-25 | Stop reason: HOSPADM

## 2024-04-25 RX ORDER — IBUPROFEN 600 MG/1
600 TABLET ORAL EVERY 6 HOURS PRN
Qty: 30 TABLET | Refills: 0 | Status: SHIPPED | OUTPATIENT
Start: 2024-04-25

## 2024-04-25 RX ORDER — SODIUM CHLORIDE 9 MG/ML
40 INJECTION, SOLUTION INTRAVENOUS AS NEEDED
Status: DISCONTINUED | OUTPATIENT
Start: 2024-04-25 | End: 2024-04-25 | Stop reason: HOSPADM

## 2024-04-25 RX ORDER — OXYCODONE AND ACETAMINOPHEN 7.5; 325 MG/1; MG/1
1 TABLET ORAL EVERY 4 HOURS PRN
Status: DISCONTINUED | OUTPATIENT
Start: 2024-04-25 | End: 2024-04-25 | Stop reason: HOSPADM

## 2024-04-25 RX ORDER — PROMETHAZINE HYDROCHLORIDE 25 MG/1
25 SUPPOSITORY RECTAL ONCE AS NEEDED
Status: DISCONTINUED | OUTPATIENT
Start: 2024-04-25 | End: 2024-04-25 | Stop reason: HOSPADM

## 2024-04-25 RX ORDER — ONDANSETRON 2 MG/ML
4 INJECTION INTRAMUSCULAR; INTRAVENOUS ONCE AS NEEDED
Status: COMPLETED | OUTPATIENT
Start: 2024-04-25 | End: 2024-04-25

## 2024-04-25 RX ORDER — ONDANSETRON 2 MG/ML
INJECTION INTRAMUSCULAR; INTRAVENOUS AS NEEDED
Status: DISCONTINUED | OUTPATIENT
Start: 2024-04-25 | End: 2024-04-25 | Stop reason: SURG

## 2024-04-25 RX ORDER — SODIUM CHLORIDE 0.9 % (FLUSH) 0.9 %
1-10 SYRINGE (ML) INJECTION AS NEEDED
Status: DISCONTINUED | OUTPATIENT
Start: 2024-04-25 | End: 2024-04-25 | Stop reason: HOSPADM

## 2024-04-25 RX ADMIN — PROPOFOL 50 MG: 10 INJECTION, EMULSION INTRAVENOUS at 07:36

## 2024-04-25 RX ADMIN — FAMOTIDINE 20 MG: 10 INJECTION INTRAVENOUS at 07:09

## 2024-04-25 RX ADMIN — ONDANSETRON 4 MG: 2 INJECTION INTRAMUSCULAR; INTRAVENOUS at 10:40

## 2024-04-25 RX ADMIN — ONDANSETRON 4 MG: 2 INJECTION INTRAMUSCULAR; INTRAVENOUS at 07:44

## 2024-04-25 RX ADMIN — Medication 20 MG: at 08:45

## 2024-04-25 RX ADMIN — KETOROLAC TROMETHAMINE 30 MG: 30 INJECTION, SOLUTION INTRAMUSCULAR at 08:45

## 2024-04-25 RX ADMIN — SUGAMMADEX 200 MG: 100 INJECTION, SOLUTION INTRAVENOUS at 08:34

## 2024-04-25 RX ADMIN — MIDAZOLAM 1 MG: 1 INJECTION INTRAMUSCULAR; INTRAVENOUS at 07:09

## 2024-04-25 RX ADMIN — Medication 20 MG: at 07:44

## 2024-04-25 RX ADMIN — HYDROMORPHONE HYDROCHLORIDE 0.5 MG: 1 INJECTION, SOLUTION INTRAMUSCULAR; INTRAVENOUS; SUBCUTANEOUS at 09:26

## 2024-04-25 RX ADMIN — SODIUM CHLORIDE, POTASSIUM CHLORIDE, SODIUM LACTATE AND CALCIUM CHLORIDE 9 ML/HR: 600; 310; 30; 20 INJECTION, SOLUTION INTRAVENOUS at 07:09

## 2024-04-25 RX ADMIN — DEXAMETHASONE SODIUM PHOSPHATE 8 MG: 4 INJECTION, SOLUTION INTRAMUSCULAR; INTRAVENOUS at 07:44

## 2024-04-25 RX ADMIN — HYDROMORPHONE HYDROCHLORIDE 0.5 MG: 1 INJECTION, SOLUTION INTRAMUSCULAR; INTRAVENOUS; SUBCUTANEOUS at 09:17

## 2024-04-25 RX ADMIN — HYDROCODONE BITARTRATE AND ACETAMINOPHEN 1 TABLET: 5; 325 TABLET ORAL at 10:24

## 2024-04-25 RX ADMIN — Medication 10 MG: at 08:34

## 2024-04-25 RX ADMIN — METOPROLOL TARTRATE 1 MG: 1 INJECTION, SOLUTION INTRAVENOUS at 07:45

## 2024-04-25 RX ADMIN — FENTANYL CITRATE 50 MCG: 50 INJECTION, SOLUTION INTRAMUSCULAR; INTRAVENOUS at 07:33

## 2024-04-25 RX ADMIN — LIDOCAINE HYDROCHLORIDE 100 MG: 20 INJECTION, SOLUTION INFILTRATION; PERINEURAL at 07:33

## 2024-04-25 RX ADMIN — FENTANYL CITRATE 50 MCG: 50 INJECTION, SOLUTION INTRAMUSCULAR; INTRAVENOUS at 09:37

## 2024-04-25 RX ADMIN — ROCURONIUM BROMIDE 50 MG: 10 INJECTION, SOLUTION INTRAVENOUS at 07:33

## 2024-04-25 RX ADMIN — PROPOFOL 250 MG: 10 INJECTION, EMULSION INTRAVENOUS at 07:33

## 2024-04-25 RX ADMIN — SODIUM CHLORIDE 2000 MG: 900 INJECTION INTRAVENOUS at 07:21

## 2024-04-25 NOTE — ANESTHESIA POSTPROCEDURE EVALUATION
Patient: Robert Beach    Procedure Summary       Date: 04/25/24 Room / Location: [facility] OR  / [facility] MAIN OR    Anesthesia Start: 0725 Anesthesia Stop: 0856    Procedures:       HYSTEROSCOPY NOVASURE ENDOMETRIAL ABLATION (Bilateral: Vagina)      SALPINGECTOMY LAPAROSCOPIC (Bilateral: Abdomen) Diagnosis:       Menorrhagia with regular cycle      Sterilization consult      (Menorrhagia with regular cycle [N92.0])      (Sterilization consult [Z30.09])    Surgeons: Wang Mckeon MD Provider: Lb Bobo MD    Anesthesia Type: general ASA Status: 2            Anesthesia Type: general    Vitals  Vitals Value Taken Time   /86 04/25/24 1030   Temp 36.1 °C (97 °F) 04/25/24 1030   Pulse 64 04/25/24 1042   Resp 14 04/25/24 1030   SpO2 100 % 04/25/24 1042   Vitals shown include unfiled device data.        Anesthesia Post Evaluation

## 2024-04-25 NOTE — OP NOTE
Op Note    Date of Procedure: 2024   Preoperative Diagnosis: 38-year-old  8 para 2-1-3-4 with menorrhagia and desire for permanent sterilization    Postoperative Diagnosis: Same    Procedure:Hysteroscopy, Novasure Ablation and laparoscopic bilateral salpingectomy    Surgeon: Mike    Assistant: Mitesh    Anesthesia: General    Estimated Blood Loss: Minimal    Specimens: Bilateral fallopian tubes    Findings: On examination under anesthesia the uterus is retroverted and mobile.  No adnexal masses are palpated.  On hysteroscopy a normal endometrial cavity is noted.  On laparoscopy the uterus tubes and ovaries appear normal.  No evidence of pelvic endometriosis.     Complications: None    Description of Operative Procedure:  After insuring informed consent, the patient was taken into the operating room where general anesthesia was administered without difficulty. Pt was then placed in the dorsal lithotomy position and prepped and draped in the usual sterile fashion. Next a weighted speculum was placed in the vagina. Cervix was grasped with a Allis clamp. Next, the cervix was dilated to a number 8 Melissa dilator to accommodate the hysteroscope. The hysteroscope was passed into the intrauterine cavity without difficulty under direct visualization.  Normal endometrial cavity was noted.  The measurements of the uterine cavity were obtained. The total uterine length was 9 cm and and cervical length of 3.5 cm. The uterine cavity length was calculated to be 5.5 cm. After these measurements were taken, the Novasure device was passed into uterine cavity. Upon opening the array, a width of 4.9 cm was found. Next a cavity check was passed and the ablation was initiated and lasted 96 seconds. After completion, hysteroscope was re-introduced and global ablation was noted.  Uterine manipulator was then placed and an Allis clamp and speculum were removed.  My gloves were changed and attention was then turned to the abdomen  where a skin incision was then made in the umbilicus. A Veress needle was then placed into the abdomen. The saline drop test was reassuring for intraperitoneal placement, and the Veress needle was then hooked up to CO2 gas. Low pressure and good flow were noted The abdomen was insufflated with three liters of CO2 gas.   Veress needle was removed and 5mm   Optiview nonbladed trocar was placed under direct visualization. Two 5-mm trocars were placed under direct visulazation in the right and left lower quadrants.  On each side the fallopian tube was elevated at the fimbriated end and the harmonic scalpel was used to coagulate and transect under each fallopian tube.  Each tube was brought out through one of the lateral trocars and sent to pathology.  The pelvis was copiously irrigated and fluid suctioned. Hemostatsis was noted. The 5 mm trocars were removed under direct visualization.  CO2 gas was allowed to escape and the 5 mm abdominal trocar was then removed.   The skin was then closed with a 4-0 Vicryl. The skin was then dressed. The uterine manipulator was removed from the uterus without difficulty. Hemostasis was noted. The patient was taken out of dorsal lithotomy position. Anesthesia was reversed. The patient was taken to the PACU.   Sponge, lap and needle counts were correct throughout the procedure.     Dr. Sagastume was responsible for performing the following activities: Retraction, Held/Positioned Camera, and removal of fallopian tube on her side of the case with harmonic scalpel.  Her skilled assistance was necessary for the success of this case.     Signed By: Wang Mckeon MD 4/25/2024 08:49 EDT

## 2024-04-25 NOTE — ANESTHESIA PREPROCEDURE EVALUATION
Anesthesia Evaluation     history of anesthetic complications:   NPO Solid Status: > 8 hours  NPO Liquid Status: > 2 hours           Airway   Mallampati: I  Dental - normal exam     Pulmonary - normal exam   Cardiovascular - normal exam    (+) dysrhythmias PVC    ROS comment: Echo 2021  ·   Calculated left ventricular EF = 60.7% Estimated left ventricular EF was in agreement with the calculated left ventricular EF. Left ventricular systolic function is normal. Normal left ventricular cavity size and wall thickness noted. All left ventricular wall segments contract normally. Left ventricular diastolic function was normal.         Neuro/Psych  (+) headaches, psychiatric history Depression and Anxiety  GI/Hepatic/Renal/Endo    (+) thyroid problem thyroid nodules    Musculoskeletal     Abdominal    Substance History      OB/GYN          Other                          Anesthesia Plan    ASA 2     general     (Had bradycardia with propofol  for block in past, required epi.)  intravenous induction     Anesthetic plan, risks, benefits, and alternatives have been provided, discussed and informed consent has been obtained with: patient and spouse/significant other.        CODE STATUS:

## 2024-04-25 NOTE — ANESTHESIA PROCEDURE NOTES
Airway  Date/Time: 4/25/2024 7:36 AM  Airway not difficult    General Information and Staff    CRNA/CAA: Mitesh Platt CRNA    Indications and Patient Condition  Indications for airway management: airway protection    Preoxygenated: yes  MILS maintained throughout  Mask difficulty assessment: 1 - vent by mask    Final Airway Details  Final airway type: endotracheal airway      Successful airway: ETT  Cuffed: yes   Successful intubation technique: direct laryngoscopy  Facilitating devices/methods: intubating stylet  Endotracheal tube insertion site: oral  Blade: Nazario  Blade size: 3  ETT size (mm): 7.0  Cormack-Lehane Classification: grade I - full view of glottis  Placement verified by: chest auscultation   Cuff volume (mL): 6  Measured from: lips  ETT/EBT  to lips (cm): 22  Number of attempts at approach: 1  Assessment: lips, teeth, and gum same as pre-op and atraumatic intubation

## 2024-04-26 LAB
LAB AP CASE REPORT: NORMAL
PATH REPORT.FINAL DX SPEC: NORMAL
PATH REPORT.GROSS SPEC: NORMAL

## 2024-04-29 ENCOUNTER — TELEPHONE (OUTPATIENT)
Dept: OBSTETRICS AND GYNECOLOGY | Age: 39
End: 2024-04-29

## 2024-04-29 NOTE — TELEPHONE ENCOUNTER
Caller: Robert Beach    Relationship: Self    Best call back number: [phone]      Who are you requesting to speak with (clinical staff, DR. ROLAND      What was the call regarding: PATIENT NEEDS TO RESCHEDULE THE POST OP APPT 5/14/24, HUB HAD NO AVAILABLITY.  PATIENT SAID SHE IS STARTING A NEW JOB AND [employer] WORK [employer].
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
## 2024-05-17 ENCOUNTER — OFFICE VISIT (OUTPATIENT)
Dept: OBSTETRICS AND GYNECOLOGY | Age: 39
End: 2024-05-17
Payer: COMMERCIAL

## 2024-05-17 VITALS
HEIGHT: 67 IN | SYSTOLIC BLOOD PRESSURE: 108 MMHG | BODY MASS INDEX: 27.47 KG/M2 | WEIGHT: 175 LBS | DIASTOLIC BLOOD PRESSURE: 74 MMHG

## 2024-05-17 DIAGNOSIS — Z98.890 POST-OPERATIVE STATE: ICD-10-CM

## 2024-05-17 DIAGNOSIS — Z80.3 FAMILY HISTORY OF BREAST CANCER: Primary | ICD-10-CM

## 2024-05-17 PROBLEM — N92.0 MENORRHAGIA WITH REGULAR CYCLE: Status: RESOLVED | Noted: 2018-07-19 | Resolved: 2024-05-17

## 2024-05-17 PROBLEM — Z30.09 STERILIZATION CONSULT: Status: RESOLVED | Noted: 2024-02-29 | Resolved: 2024-05-17

## 2024-05-17 PROCEDURE — 99024 POSTOP FOLLOW-UP VISIT: CPT | Performed by: OBSTETRICS & GYNECOLOGY

## 2024-05-17 PROCEDURE — 1160F RVW MEDS BY RX/DR IN RCRD: CPT | Performed by: OBSTETRICS & GYNECOLOGY

## 2024-05-17 PROCEDURE — 1159F MED LIST DOCD IN RCRD: CPT | Performed by: OBSTETRICS & GYNECOLOGY

## 2024-05-17 RX ORDER — FLUCONAZOLE 150 MG/1
150 TABLET ORAL ONCE
Qty: 1 TABLET | Refills: 0 | Status: SHIPPED | OUTPATIENT
Start: 2024-05-17 | End: 2024-05-17

## 2024-05-17 RX ORDER — CEPHALEXIN 500 MG/1
500 CAPSULE ORAL 3 TIMES DAILY
Qty: 21 CAPSULE | Refills: 0 | Status: SHIPPED | OUTPATIENT
Start: 2024-05-17 | End: 2024-05-24

## 2024-05-17 NOTE — PROGRESS NOTES
"Subjective   Chief Complaint   Patient presents with    Post-op     3wk Post Op HYSTEROSCOPY NOVASURE ENDOMETRIAL ABLATION     Robert Beach is a 38 y.o. year old  presenting to be seen for her post-operative visit.  Currently she reports she had some discomfort at her right laparoscopic incision.  She squeezed the area and got a little bit of white discharge out.  She had some cramping and gas pain for a little while but that is improved.  She is not having vaginal bleeding.      The pathology results from her procedure are in Robert's record and are benign.      OTHER THINGS SHE WANTS TO DISCUSS TODAY:  Nothing else    The following portions of the patient's history were reviewed and updated as appropriate:current medications and allergies       Objective   /74   Ht 170.2 cm (67\")   Wt 79.4 kg (175 lb)   LMP 2024 Comment: hcg negative  BMI 27.41 kg/m²     General:  well developed; well nourished  no acute distress   Abdomen: soft, non-tender; no masses  Laparoscopic incisions are noted.  No surrounding erythema.  A little bit more induration on the right sided incision but no drainage.   Pelvis: Not performed.          Assessment   S/P endometrial ablation and bilateral salpingectomy-patient had some drainage of the right laparoscopic incision at home.  I do not see anything currently and the site looks good but the patient is concerned since it is a little bit more tender.  She would like to have Keflex on hand in case over the weekend it gets worse.  She would also like a Diflucan if she has the symptoms.  Prescriptions were sent to the pharmacy.  Patient has had a family history of breast cancer and has never had genetic testing.  She would like that sent off today.  Mammogram was also ordered.     Plan   May return to full activity with no restrictions  The importance of keeping all planned follow-up and taking all medications as prescribed was emphasized.  Follow up for annual exam "     No orders of the defined types were placed in this encounter.             Note: Speech recognition transcription software may have been used to create portions of this document.  An attempt at proofreading has been made but errors in transcription could still be present.     Answers submitted by the patient for this visit:  Other (Submitted on 5/17/2024)  Please describe your symptoms.: Post op  Have you had these symptoms before?: No  How long have you been having these symptoms?: 1-4 days  Primary Reason for Visit (Submitted on 5/17/2024)  What is the primary reason for your visit?: Other

## 2024-12-04 ENCOUNTER — TELEPHONE (OUTPATIENT)
Dept: OBSTETRICS AND GYNECOLOGY | Age: 39
End: 2024-12-04
Payer: COMMERCIAL

## 2024-12-04 ENCOUNTER — CLINICAL SUPPORT (OUTPATIENT)
Dept: OBSTETRICS AND GYNECOLOGY | Age: 39
End: 2024-12-04
Payer: COMMERCIAL

## 2024-12-04 DIAGNOSIS — Z20.2 EXPOSURE TO STD: Primary | ICD-10-CM

## 2024-12-04 PROCEDURE — 81003 URINALYSIS AUTO W/O SCOPE: CPT | Performed by: PHYSICIAN ASSISTANT

## 2024-12-04 NOTE — TELEPHONE ENCOUNTER
"    Caller: Robert Beach \"Robert\"  Female, 39 y.o., 1985  MRN: [id number]  CSN: [id number]  Phone: [phone]    Relationship to patient: SELF        Chief complaint: PT CALLED IN TO [facility] PELVIC US, UNABLE TO WARM TRANSFER THE CALL, PT STATES ANY FRIDAY AFTER 1PM WOULD BE FINE    Type of visit: US     Requested date: ANY FRIDAY AFTER 1PM       Additional notes:PLEASE SEE TE BELOW REGARDING CALL               Emely Mcmahan PA to Robert Beach         12/4/24  1:39 PM  Hi. I think it's a good idea to come in to be seen just to be sure everything is ok. These symptoms are not uncommon after an ablation, but let's be sure nothing else is going on. I would also suggest scheduling a pelvic u/s.      Just call the office to schedule at your convenience  "

## 2024-12-06 LAB
C TRACH RRNA SPEC QL NAA+PROBE: NEGATIVE
N GONORRHOEA RRNA SPEC QL NAA+PROBE: NEGATIVE

## 2025-01-13 RX ORDER — ACYCLOVIR 400 MG/1
TABLET ORAL
Qty: 30 TABLET | Refills: 3 | Status: SHIPPED | OUTPATIENT
Start: 2025-01-13

## (undated) DEVICE — ENDOPATH XCEL BLADELESS TROCARS WITH STABILITY SLEEVES: Brand: ENDOPATH XCEL

## (undated) DEVICE — STRAP STIRUP WO/ RNG

## (undated) DEVICE — SOL NACL 0.9PCT 1000ML

## (undated) DEVICE — APPL CHLORAPREP HI/LITE 26ML ORNG

## (undated) DEVICE — ENDOPATH XCEL UNIVERSAL TROCAR STABLILITY SLEEVES: Brand: ENDOPATH XCEL

## (undated) DEVICE — GLV SURG SIGNATURE ESSENTIAL PF LTX SZ6

## (undated) DEVICE — ST IRR CYSTO W/SPK 77IN LF

## (undated) DEVICE — ADHS SKIN SURG TISS VISC PREMIERPRO EXOFIN HI/VISC FAST/DRY

## (undated) DEVICE — DRSNG TELFA PAD NONADH STR 1S 3X4IN

## (undated) DEVICE — LAPAROSCOPIC SMOKE FILTRATION SYSTEM: Brand: PALL LAPAROSHIELD® PLUS LAPAROSCOPIC SMOKE FILTRATION SYSTEM

## (undated) DEVICE — SUT VIC 0 TN 27IN DYED JTN0G

## (undated) DEVICE — MANIP UTER KRONNER

## (undated) DEVICE — 1000ML,PRESSURE INFUSER W/STOPCOCK: Brand: MEDLINE

## (undated) DEVICE — SYR CONTRL PRESS/LO FIX/M/LL W/THMB/RNG 10ML

## (undated) DEVICE — LOU GYN LAPAROSCOPY: Brand: MEDLINE INDUSTRIES, INC.

## (undated) DEVICE — ANTIBACTERIAL UNDYED BRAIDED (POLYGLACTIN 910), SYNTHETIC ABSORBABLE SUTURE: Brand: COATED VICRYL

## (undated) DEVICE — LOU D & C HYSTEROSCOPY: Brand: MEDLINE INDUSTRIES, INC.

## (undated) DEVICE — TUBING, SUCTION, 1/4" X 20', STRAIGHT: Brand: MEDLINE INDUSTRIES, INC.

## (undated) DEVICE — PROB ABL ENDOMTRL NOVASURE/G4 W/SURESND

## (undated) DEVICE — ENDOPATH PNEUMONEEDLE INSUFFLATION NEEDLES WITH LUER LOCK CONNECTORS 120MM: Brand: ENDOPATH